# Patient Record
Sex: FEMALE | Race: WHITE | Employment: FULL TIME | URBAN - METROPOLITAN AREA
[De-identification: names, ages, dates, MRNs, and addresses within clinical notes are randomized per-mention and may not be internally consistent; named-entity substitution may affect disease eponyms.]

---

## 2021-11-16 LAB
CHLAMYDIA, EXTERNAL: NEGATIVE
HBSAG, EXTERNAL: NEGATIVE
HIV, EXTERNAL: NON REACTIVE
N. GONORRHEA, EXTERNAL: NEGATIVE
RUBELLA, EXTERNAL: 2.49
T. PALLIDUM, EXTERNAL: NON REACTIVE
TYPE, ABO & RH, EXTERNAL: NORMAL

## 2022-04-04 LAB — T. PALLIDUM, EXTERNAL: NON REACTIVE

## 2022-05-15 ENCOUNTER — ANESTHESIA (OUTPATIENT)
Dept: LABOR AND DELIVERY | Age: 32
End: 2022-05-15
Payer: COMMERCIAL

## 2022-05-15 ENCOUNTER — ANESTHESIA EVENT (OUTPATIENT)
Dept: LABOR AND DELIVERY | Age: 32
End: 2022-05-15
Payer: COMMERCIAL

## 2022-05-15 ENCOUNTER — HOSPITAL ENCOUNTER (INPATIENT)
Age: 32
LOS: 3 days | Discharge: HOME | End: 2022-05-18
Attending: OBSTETRICS & GYNECOLOGY | Admitting: OBSTETRICS & GYNECOLOGY
Payer: COMMERCIAL

## 2022-05-15 PROBLEM — O42.90 PROM (PREMATURE RUPTURE OF MEMBRANES): Status: ACTIVE | Noted: 2022-05-15

## 2022-05-15 PROBLEM — O30.043 TWIN PREGNANCY, DICHORIONIC/DIAMNIOTIC, THIRD TRIMESTER: Status: ACTIVE | Noted: 2022-05-15

## 2022-05-15 LAB
ABO + RH BLD: NORMAL
BASOPHILS # BLD: 0.1 K/UL (ref 0–0.1)
BASOPHILS NFR BLD: 1 % (ref 0–1)
BLOOD GROUP ANTIBODIES SERPL: NORMAL
DIFFERENTIAL METHOD BLD: ABNORMAL
EOSINOPHIL # BLD: 0.1 K/UL (ref 0–0.4)
EOSINOPHIL NFR BLD: 1 % (ref 0–7)
ERYTHROCYTE [DISTWIDTH] IN BLOOD BY AUTOMATED COUNT: 13.7 % (ref 11.5–14.5)
HCT VFR BLD AUTO: 38.1 % (ref 35–47)
HGB BLD-MCNC: 13.4 G/DL (ref 11.5–16)
IMM GRANULOCYTES # BLD AUTO: 0.1 K/UL (ref 0–0.04)
IMM GRANULOCYTES NFR BLD AUTO: 1 % (ref 0–0.5)
LYMPHOCYTES # BLD: 1.8 K/UL (ref 0.8–3.5)
LYMPHOCYTES NFR BLD: 19 % (ref 12–49)
MCH RBC QN AUTO: 35.8 PG (ref 26–34)
MCHC RBC AUTO-ENTMCNC: 35.2 G/DL (ref 30–36.5)
MCV RBC AUTO: 101.9 FL (ref 80–99)
MONOCYTES # BLD: 0.8 K/UL (ref 0–1)
MONOCYTES NFR BLD: 8 % (ref 5–13)
NEUTS SEG # BLD: 6.8 K/UL (ref 1.8–8)
NEUTS SEG NFR BLD: 70 % (ref 32–75)
NRBC # BLD: 0 K/UL (ref 0–0.01)
NRBC BLD-RTO: 0 PER 100 WBC
PLATELET # BLD AUTO: 204 K/UL (ref 150–400)
PMV BLD AUTO: 10.2 FL (ref 8.9–12.9)
RBC # BLD AUTO: 3.74 M/UL (ref 3.8–5.2)
SPECIMEN EXP DATE BLD: NORMAL
WBC # BLD AUTO: 9.6 K/UL (ref 3.6–11)

## 2022-05-15 PROCEDURE — 76060000078 HC EPIDURAL ANESTHESIA

## 2022-05-15 PROCEDURE — 75410000001 HC DELIVERY VAGINAL/MULTIPLE

## 2022-05-15 PROCEDURE — 0UQMXZZ REPAIR VULVA, EXTERNAL APPROACH: ICD-10-PCS | Performed by: OBSTETRICS & GYNECOLOGY

## 2022-05-15 PROCEDURE — 99285 EMERGENCY DEPT VISIT HI MDM: CPT

## 2022-05-15 PROCEDURE — 85025 COMPLETE CBC W/AUTO DIFF WBC: CPT

## 2022-05-15 PROCEDURE — 74011250636 HC RX REV CODE- 250/636: Performed by: ANESTHESIOLOGY

## 2022-05-15 PROCEDURE — 65270000029 HC RM PRIVATE

## 2022-05-15 PROCEDURE — 74011250637 HC RX REV CODE- 250/637: Performed by: OBSTETRICS & GYNECOLOGY

## 2022-05-15 PROCEDURE — 74011000250 HC RX REV CODE- 250: Performed by: ANESTHESIOLOGY

## 2022-05-15 PROCEDURE — 74011250636 HC RX REV CODE- 250/636: Performed by: OBSTETRICS & GYNECOLOGY

## 2022-05-15 PROCEDURE — 36415 COLL VENOUS BLD VENIPUNCTURE: CPT

## 2022-05-15 PROCEDURE — 75410000002 HC LABOR FEE PER 1 HR

## 2022-05-15 PROCEDURE — 75410000003 HC RECOV DEL/VAG/CSECN EA 0.5 HR

## 2022-05-15 PROCEDURE — 86900 BLOOD TYPING SEROLOGIC ABO: CPT

## 2022-05-15 PROCEDURE — 94760 N-INVAS EAR/PLS OXIMETRY 1: CPT

## 2022-05-15 PROCEDURE — 0KQM0ZZ REPAIR PERINEUM MUSCLE, OPEN APPROACH: ICD-10-PCS | Performed by: OBSTETRICS & GYNECOLOGY

## 2022-05-15 PROCEDURE — 74011000258 HC RX REV CODE- 258: Performed by: OBSTETRICS & GYNECOLOGY

## 2022-05-15 PROCEDURE — 88307 TISSUE EXAM BY PATHOLOGIST: CPT

## 2022-05-15 PROCEDURE — 4A1HXCZ MONITORING OF PRODUCTS OF CONCEPTION, CARDIAC RATE, EXTERNAL APPROACH: ICD-10-PCS | Performed by: OBSTETRICS & GYNECOLOGY

## 2022-05-15 RX ORDER — ONDANSETRON 4 MG/1
4 TABLET, ORALLY DISINTEGRATING ORAL
Status: DISCONTINUED | OUTPATIENT
Start: 2022-05-15 | End: 2022-05-15 | Stop reason: HOSPADM

## 2022-05-15 RX ORDER — NALOXONE HYDROCHLORIDE 0.4 MG/ML
0.4 INJECTION, SOLUTION INTRAMUSCULAR; INTRAVENOUS; SUBCUTANEOUS AS NEEDED
Status: DISCONTINUED | OUTPATIENT
Start: 2022-05-15 | End: 2022-05-15 | Stop reason: HOSPADM

## 2022-05-15 RX ORDER — FENTANYL CITRATE 50 UG/ML
INJECTION, SOLUTION INTRAMUSCULAR; INTRAVENOUS AS NEEDED
Status: DISCONTINUED | OUTPATIENT
Start: 2022-05-15 | End: 2022-05-15 | Stop reason: HOSPADM

## 2022-05-15 RX ORDER — FOLIC ACID 1 MG/1
0.4 TABLET ORAL DAILY
COMMUNITY

## 2022-05-15 RX ORDER — BUPIVACAINE HYDROCHLORIDE 2.5 MG/ML
INJECTION, SOLUTION EPIDURAL; INFILTRATION; INTRACAUDAL AS NEEDED
Status: DISCONTINUED | OUTPATIENT
Start: 2022-05-15 | End: 2022-05-15 | Stop reason: HOSPADM

## 2022-05-15 RX ORDER — OXYTOCIN/RINGER'S LACTATE 30/500 ML
1-25 PLASTIC BAG, INJECTION (ML) INTRAVENOUS
Status: DISCONTINUED | OUTPATIENT
Start: 2022-05-15 | End: 2022-05-18 | Stop reason: HOSPADM

## 2022-05-15 RX ORDER — OXYTOCIN/RINGER'S LACTATE 30/500 ML
10 PLASTIC BAG, INJECTION (ML) INTRAVENOUS AS NEEDED
Status: COMPLETED | OUTPATIENT
Start: 2022-05-15 | End: 2022-05-15

## 2022-05-15 RX ORDER — OXYTOCIN/RINGER'S LACTATE 30/500 ML
10 PLASTIC BAG, INJECTION (ML) INTRAVENOUS AS NEEDED
Status: CANCELLED | OUTPATIENT
Start: 2022-05-15

## 2022-05-15 RX ORDER — SERTRALINE HYDROCHLORIDE 100 MG/1
100 TABLET, FILM COATED ORAL DAILY
COMMUNITY

## 2022-05-15 RX ORDER — SODIUM CHLORIDE 0.9 % (FLUSH) 0.9 %
5-40 SYRINGE (ML) INJECTION AS NEEDED
Status: DISCONTINUED | OUTPATIENT
Start: 2022-05-15 | End: 2022-05-18 | Stop reason: HOSPADM

## 2022-05-15 RX ORDER — SODIUM CHLORIDE 0.9 % (FLUSH) 0.9 %
5-40 SYRINGE (ML) INJECTION EVERY 8 HOURS
Status: DISCONTINUED | OUTPATIENT
Start: 2022-05-15 | End: 2022-05-18 | Stop reason: HOSPADM

## 2022-05-15 RX ORDER — FENTANYL CITRATE 50 UG/ML
100 INJECTION, SOLUTION INTRAMUSCULAR; INTRAVENOUS ONCE
Status: DISCONTINUED | OUTPATIENT
Start: 2022-05-15 | End: 2022-05-15 | Stop reason: HOSPADM

## 2022-05-15 RX ORDER — NALBUPHINE HYDROCHLORIDE 20 MG/ML
5 INJECTION, SOLUTION INTRAMUSCULAR; INTRAVENOUS; SUBCUTANEOUS
Status: DISCONTINUED | OUTPATIENT
Start: 2022-05-15 | End: 2022-05-15 | Stop reason: HOSPADM

## 2022-05-15 RX ORDER — DIPHENHYDRAMINE HCL 25 MG
25 CAPSULE ORAL
Status: DISCONTINUED | OUTPATIENT
Start: 2022-05-15 | End: 2022-05-18 | Stop reason: HOSPADM

## 2022-05-15 RX ORDER — BETAMETHASONE SODIUM PHOSPHATE AND BETAMETHASONE ACETATE 3; 3 MG/ML; MG/ML
12 INJECTION, SUSPENSION INTRA-ARTICULAR; INTRALESIONAL; INTRAMUSCULAR; SOFT TISSUE EVERY 24 HOURS
Status: DISCONTINUED | OUTPATIENT
Start: 2022-05-15 | End: 2022-05-15

## 2022-05-15 RX ORDER — LIDOCAINE HYDROCHLORIDE AND EPINEPHRINE 15; 5 MG/ML; UG/ML
4.5 INJECTION, SOLUTION EPIDURAL AS NEEDED
Status: DISCONTINUED | OUTPATIENT
Start: 2022-05-15 | End: 2022-05-15 | Stop reason: HOSPADM

## 2022-05-15 RX ORDER — OXYTOCIN/RINGER'S LACTATE 30/500 ML
87.3 PLASTIC BAG, INJECTION (ML) INTRAVENOUS AS NEEDED
Status: COMPLETED | OUTPATIENT
Start: 2022-05-15 | End: 2022-05-15

## 2022-05-15 RX ORDER — OXYCODONE AND ACETAMINOPHEN 5; 325 MG/1; MG/1
1 TABLET ORAL
Status: DISCONTINUED | OUTPATIENT
Start: 2022-05-15 | End: 2022-05-18 | Stop reason: HOSPADM

## 2022-05-15 RX ORDER — SODIUM CHLORIDE 0.9 % (FLUSH) 0.9 %
5-40 SYRINGE (ML) INJECTION AS NEEDED
Status: CANCELLED | OUTPATIENT
Start: 2022-05-15

## 2022-05-15 RX ORDER — DOCUSATE SODIUM 100 MG/1
100 CAPSULE, LIQUID FILLED ORAL
Status: DISCONTINUED | OUTPATIENT
Start: 2022-05-15 | End: 2022-05-18 | Stop reason: HOSPADM

## 2022-05-15 RX ORDER — VALACYCLOVIR HYDROCHLORIDE 500 MG/1
500 TABLET, FILM COATED ORAL 2 TIMES DAILY
COMMUNITY
End: 2022-05-18

## 2022-05-15 RX ORDER — MAG HYDROX/ALUMINUM HYD/SIMETH 200-200-20
30 SUSPENSION, ORAL (FINAL DOSE FORM) ORAL
Status: DISCONTINUED | OUTPATIENT
Start: 2022-05-15 | End: 2022-05-15 | Stop reason: HOSPADM

## 2022-05-15 RX ORDER — LANOLIN ALCOHOL/MO/W.PET/CERES
3 CREAM (GRAM) TOPICAL
Status: DISCONTINUED | OUTPATIENT
Start: 2022-05-15 | End: 2022-05-18 | Stop reason: HOSPADM

## 2022-05-15 RX ORDER — OXYTOCIN/RINGER'S LACTATE 30/500 ML
87.3 PLASTIC BAG, INJECTION (ML) INTRAVENOUS AS NEEDED
Status: CANCELLED | OUTPATIENT
Start: 2022-05-15

## 2022-05-15 RX ORDER — ONDANSETRON 4 MG/1
4 TABLET, ORALLY DISINTEGRATING ORAL
Status: DISCONTINUED | OUTPATIENT
Start: 2022-05-15 | End: 2022-05-18 | Stop reason: HOSPADM

## 2022-05-15 RX ORDER — FENTANYL/BUPIVACAINE/NS/PF 2-1250MCG
1-16 PREFILLED PUMP RESERVOIR EPIDURAL CONTINUOUS
Status: DISCONTINUED | OUTPATIENT
Start: 2022-05-15 | End: 2022-05-15 | Stop reason: HOSPADM

## 2022-05-15 RX ORDER — BETAMETHASONE SODIUM PHOSPHATE AND BETAMETHASONE ACETATE 3; 3 MG/ML; MG/ML
12 INJECTION, SUSPENSION INTRA-ARTICULAR; INTRALESIONAL; INTRAMUSCULAR; SOFT TISSUE EVERY 24 HOURS
Status: DISCONTINUED | OUTPATIENT
Start: 2022-05-15 | End: 2022-05-15 | Stop reason: SDUPTHER

## 2022-05-15 RX ORDER — LIDOCAINE HYDROCHLORIDE AND EPINEPHRINE 15; 5 MG/ML; UG/ML
INJECTION, SOLUTION EPIDURAL AS NEEDED
Status: DISCONTINUED | OUTPATIENT
Start: 2022-05-15 | End: 2022-05-15 | Stop reason: HOSPADM

## 2022-05-15 RX ORDER — HYDROCORTISONE 1 %
CREAM (GRAM) TOPICAL AS NEEDED
Status: DISCONTINUED | OUTPATIENT
Start: 2022-05-15 | End: 2022-05-18 | Stop reason: HOSPADM

## 2022-05-15 RX ORDER — OXYCODONE AND ACETAMINOPHEN 5; 325 MG/1; MG/1
2 TABLET ORAL
Status: DISCONTINUED | OUTPATIENT
Start: 2022-05-15 | End: 2022-05-18 | Stop reason: HOSPADM

## 2022-05-15 RX ORDER — MISOPROSTOL 200 UG/1
800 TABLET ORAL ONCE
Status: COMPLETED | OUTPATIENT
Start: 2022-05-15 | End: 2022-05-15

## 2022-05-15 RX ORDER — AMMONIA 15 % (W/V)
1 AMPUL (EA) INHALATION AS NEEDED
Status: DISCONTINUED | OUTPATIENT
Start: 2022-05-15 | End: 2022-05-18 | Stop reason: HOSPADM

## 2022-05-15 RX ORDER — BUPIVACAINE HYDROCHLORIDE 2.5 MG/ML
INJECTION, SOLUTION EPIDURAL; INFILTRATION; INTRACAUDAL
Status: COMPLETED
Start: 2022-05-15 | End: 2022-05-15

## 2022-05-15 RX ORDER — NORETHINDRONE AND ETHINYL ESTRADIOL 0.5-0.035
10 KIT ORAL ONCE
Status: DISCONTINUED | OUTPATIENT
Start: 2022-05-15 | End: 2022-05-15 | Stop reason: HOSPADM

## 2022-05-15 RX ORDER — SODIUM CHLORIDE, SODIUM LACTATE, POTASSIUM CHLORIDE, CALCIUM CHLORIDE 600; 310; 30; 20 MG/100ML; MG/100ML; MG/100ML; MG/100ML
125 INJECTION, SOLUTION INTRAVENOUS CONTINUOUS
Status: CANCELLED | OUTPATIENT
Start: 2022-05-15

## 2022-05-15 RX ORDER — FENTANYL CITRATE 50 UG/ML
INJECTION, SOLUTION INTRAMUSCULAR; INTRAVENOUS
Status: COMPLETED
Start: 2022-05-15 | End: 2022-05-15

## 2022-05-15 RX ORDER — VITAMIN A ACETATE, BETA CAROTENE, ASCORBIC ACID, CHOLECALCIFEROL, .ALPHA.-TOCOPHEROL ACETATE, DL-, THIAMINE MONONITRATE, RIBOFLAVIN, NIACINAMIDE, PYRIDOXINE HYDROCHLORIDE, FOLIC ACID, CYANOCOBALAMIN, CALCIUM CARBONATE, FERROUS FUMARATE, ZINC OXIDE, CUPRIC OXIDE 3080; 12; 120; 400; 1; 1.84; 3; 20; 22; 920; 25; 200; 27; 10; 2 [IU]/1; UG/1; MG/1; [IU]/1; MG/1; MG/1; MG/1; MG/1; MG/1; [IU]/1; MG/1; MG/1; MG/1; MG/1; MG/1
1 TABLET, FILM COATED ORAL DAILY
COMMUNITY

## 2022-05-15 RX ORDER — HYDROCORTISONE ACETATE PRAMOXINE HCL 2.5; 1 G/100G; G/100G
CREAM TOPICAL AS NEEDED
Status: DISCONTINUED | OUTPATIENT
Start: 2022-05-15 | End: 2022-05-18 | Stop reason: HOSPADM

## 2022-05-15 RX ORDER — GUAIFENESIN 100 MG/5ML
81 LIQUID (ML) ORAL DAILY
COMMUNITY
End: 2022-05-18

## 2022-05-15 RX ORDER — SODIUM CHLORIDE 0.9 % (FLUSH) 0.9 %
5-40 SYRINGE (ML) INJECTION EVERY 8 HOURS
Status: CANCELLED | OUTPATIENT
Start: 2022-05-15

## 2022-05-15 RX ORDER — SIMETHICONE 80 MG
80 TABLET,CHEWABLE ORAL
Status: DISCONTINUED | OUTPATIENT
Start: 2022-05-15 | End: 2022-05-18 | Stop reason: HOSPADM

## 2022-05-15 RX ADMIN — BETAMETHASONE SODIUM PHOSPHATE AND BETAMETHASONE ACETATE 12 MG: 3; 3 INJECTION, SUSPENSION INTRA-ARTICULAR; INTRALESIONAL; INTRAMUSCULAR at 05:53

## 2022-05-15 RX ADMIN — AMPICILLIN SODIUM 1 G: 1 INJECTION, POWDER, FOR SOLUTION INTRAMUSCULAR; INTRAVENOUS at 14:12

## 2022-05-15 RX ADMIN — Medication 87.3 MILLI-UNITS/MIN: at 20:56

## 2022-05-15 RX ADMIN — MISOPROSTOL 800 MCG: 200 TABLET ORAL at 20:20

## 2022-05-15 RX ADMIN — Medication 10000 MILLI-UNITS: at 20:15

## 2022-05-15 RX ADMIN — Medication 5 ML: at 18:16

## 2022-05-15 RX ADMIN — FENTANYL CITRATE 100 MCG: 50 INJECTION, SOLUTION INTRAMUSCULAR; INTRAVENOUS at 18:19

## 2022-05-15 RX ADMIN — AMPICILLIN SODIUM 1 G: 1 INJECTION, POWDER, FOR SOLUTION INTRAMUSCULAR; INTRAVENOUS at 09:59

## 2022-05-15 RX ADMIN — AMPICILLIN SODIUM 2 G: 2 INJECTION, POWDER, FOR SOLUTION INTRAVENOUS at 05:51

## 2022-05-15 RX ADMIN — OXYTOCIN 2 MILLI-UNITS/MIN: 10 INJECTION INTRAVENOUS at 13:35

## 2022-05-15 RX ADMIN — AMPICILLIN SODIUM 1 G: 1 INJECTION, POWDER, FOR SOLUTION INTRAMUSCULAR; INTRAVENOUS at 18:41

## 2022-05-15 RX ADMIN — BUPIVACAINE HYDROCHLORIDE 3 ML: 2.5 INJECTION, SOLUTION EPIDURAL; INFILTRATION; INTRACAUDAL; PERINEURAL at 18:25

## 2022-05-15 RX ADMIN — BUPIVACAINE HYDROCHLORIDE 2 ML: 2.5 INJECTION, SOLUTION EPIDURAL; INFILTRATION; INTRACAUDAL; PERINEURAL at 18:22

## 2022-05-15 RX ADMIN — BUPIVACAINE HYDROCHLORIDE 2 ML: 2.5 INJECTION, SOLUTION EPIDURAL; INFILTRATION; INTRACAUDAL; PERINEURAL at 18:28

## 2022-05-15 RX ADMIN — Medication 10 ML/HR: at 18:41

## 2022-05-15 NOTE — ED PROVIDER NOTES
HPI     Chief Complaint   Patient presents with    Leakage/Loss of Fluid    Rupture of Membranes       Past Medical History:   Diagnosis Date    Asthma     Herpes simplex virus (HSV) infection     Infertility, female     Neuromyelitis optica (devic) ([facility] [address]     Psychiatric problem     Anxiety and depression       History reviewed. No pertinent surgical history. History reviewed. No pertinent family history. Social History     Socioeconomic History    Marital status: Not on file     Spouse name: Not on file    Number of children: Not on file    Years of education: Not on file    Highest education level: Not on file   Occupational History    Not on file   Tobacco Use    Smoking status: Never Smoker    Smokeless tobacco: Never Used   Substance and Sexual Activity    Alcohol use: Not Currently    Drug use: Not Currently    Sexual activity: Not on file   Other Topics Concern     Service Not Asked    Blood Transfusions Not Asked    Caffeine Concern Not Asked    Occupational Exposure Not Asked    Hobby Hazards Not Asked    Sleep Concern Not Asked    Stress Concern Not Asked    Weight Concern Not Asked    Special Diet Not Asked    Back Care Not Asked    Exercise Not Asked    Bike Helmet Not Asked   [address] Not Asked    Self-Exams Not Asked   Social History Narrative    Not on file     Social Determinants of Health     Financial Resource Strain:     Difficulty of Paying Living Expenses: Not on file   Food Insecurity:     Worried About Running Out of Food in the Last Year: Not on file    Freedom of Food in the Last Year: Not on file   Transportation Needs:     Lack of Transportation (Medical): Not on file    Lack of Transportation (Non-Medical):  Not on file   Physical Activity:     Days of Exercise per Week: Not on file    Minutes of Exercise per Session: Not on file   Stress:     Feeling of Stress : Not on file   Social Connections:     Frequency of Communication with Friends and Family: Not on file    Frequency of Social Gatherings with Friends and Family: Not on file    Attends Temple Services: Not on file    Active Member of Clubs or Organizations: Not on file    Attends Club or Organization Meetings: Not on file    Marital Status: Not on file   Intimate Partner Violence:     Fear of Current or Ex-Partner: Not on file    Emotionally Abused: Not on file    Physically Abused: Not on file    Sexually Abused: Not on file   Housing Stability:     Unable to Pay for Housing in the Last Year: Not on file    Number of [place] in the Last Year: Not on file    Unstable Housing in the Last Year: Not on file         ALLERGIES: Patient has no allergy information on record. Review of Systems   Constitutional: Negative. HENT: Negative. Eyes: Negative. Respiratory: Negative. Cardiovascular: Negative. Gastrointestinal: Negative. Endocrine: Negative. Genitourinary: Negative. Musculoskeletal: Negative. Skin: Negative. Allergic/Immunologic: Negative. Neurological: Negative. Hematological: Negative. Psychiatric/Behavioral: Negative. Vitals:    05/15/22 0418   BP: (!) 141/93   Pulse: 81   Resp: 16   Temp: 98.5 °F (36.9 °C)            Physical Exam  Nursing note reviewed. Exam conducted with a chaperone present. Constitutional:       Appearance: Normal appearance. HENT:      Head: Normocephalic and atraumatic. Nose: Nose normal.      Mouth/Throat:      Mouth: Mucous membranes are dry. Eyes:      Extraocular Movements: Extraocular movements intact. Pupils: Pupils are equal, round, and reactive to light. Cardiovascular:      Rate and Rhythm: Normal rate. Genitourinary:     General: Normal vulva. Musculoskeletal:      Cervical back: Normal range of motion and neck supple. Skin:     General: Skin is warm and dry. Neurological:      General: No focal deficit present. Mental Status: She is alert. Psychiatric:         Mood and Affect: Mood normal.      Cervix- difficult to examine Head felt pressed against cervix w/o noticeable dilation, felt effaced 60-70 %    MDM  Number of Diagnoses or Management Options     Amount and/or Complexity of Data Reviewed  Clinical lab tests: reviewed  Decide to obtain previous medical records or to obtain history from someone other than the patient: yes  Review and summarize past medical records: yes  Independent visualization of images, tracings, or specimens: yes (NST and Bedside US)                 Procedures   Bedside US reveal Di/Di twins Vertex/Vertex  NSt- /130        Variability- Mod/Mod        Decelerations-None/None        Accelerations Pos/Pos        Irregular contractions    [unfilled]  29 y/o  @ 34 weeks  Di/Di Twins Vtx/Vtx  PPROM  H/O HSV- No Lesions- Valtex 500 mg QD  H/o Depression- Sertaline 100mg every day  Asthma- Abuterol  Anxiety- Zoloft

## 2022-05-15 NOTE — H&P
History & Physical    Name: Damian Grace MRN: [id number]  SSN: [id number]    YOB: 1990  Age: 28 y.o. Sex: female      Subjective:     Reason for Admission:  Pregnancy and PPROM    History of Present Illness: Damian Grace is a 28 y.o.  female with an estimated gestational age of 31w0d with Estimated Date of Delivery: 22. Patient complains of moderate vaginal leaking of fluid and normal fetal movement for 1 days. Pregnancy has been complicated by  premature rupture of membranes and twins. Patient denies abdominal pain  , chest pain, contractions, fever, headache , nausea and vomiting, pelvic pressure, right upper quadrant pain  , shortness of breath, swelling, vaginal bleeding  and visual disturbances. OB History        1    Para        Term                AB        Living           SAB        IAB        Ectopic        Molar        Multiple        Live Births                  Past Medical History:   Diagnosis Date    Asthma     Herpes simplex virus (HSV) infection     Infertility, female     Neuromyelitis optica (devic) ([facility] [address]     Psychiatric problem     Anxiety and depression     History reviewed. No pertinent surgical history. Social History     Occupational History    Not on file   Tobacco Use    Smoking status: Never Smoker    Smokeless tobacco: Never Used   Substance and Sexual Activity    Alcohol use: Not Currently    Drug use: Not Currently    Sexual activity: Not on file     History reviewed. No pertinent family history. No Known Allergies  Prior to Admission medications    Medication Sig Start Date End Date Taking? Authorizing Provider   sertraline (Zoloft) 100 mg tablet Take 100 mg by mouth daily. Indications: anxiousness associated with depression   Yes Provider, Historical   valACYclovir (Valtrex) 500 mg tablet Take 500 mg by mouth two (2) times a day.  Indications: genital herpes   Yes Provider, Historical   prenatal vit-calcium-iron-fa (Prenatal Plus, calcium carb,) 27 mg iron- 1 mg tab Take 1 Tablet by mouth daily. Yes Provider, Historical   folic acid (FOLVITE) 1 mg tablet Take 0.4 mg by mouth daily. Yes Provider, Historical   aspirin 81 mg chewable tablet Take 81 mg by mouth daily. Yes Provider, Historical   Iron BisGl &PS Pre-U-F80-[id number] 299-61-22-6 mg-mg-mcg-mg cap Take  by mouth. Yes Provider, Historical        Review of Systems    Objective:     Vitals:    Vitals:    05/15/22 0418 05/15/22 0517 05/15/22 0530   BP: (!) 141/93  138/79   Pulse: 81  68   Resp: 16  16   Temp: 98.5 °F (36.9 °C)  98.5 °F (36.9 °C)   Height:  5' 6\" (1.676 m)       Temp (24hrs), Av.5 °F (36.9 °C), Min:98.5 °F (36.9 °C), Max:98.5 °F (36.9 °C)    BP  Min: 138/79  Max: 141/93     Physical Exam    Cervical Exam: 0 cm dilated    70% effaced    -3 station    Presenting Part: VTX/VTX   Cervical Position: posterior  Consistency: Soft  Difficult exam limited by Vaginismus  Uterine Activity: Irregular  Membranes: Premature Rupture of Membranes;  Amniotic Fluid: clear fluid  Fetal Heart Rate: Baseline: 130/130 per minute  Variability: moderate  Accelerations: yes  Decelerations: none  Uterine contractions: irregular, every 5 minutes       Labs:   Recent Results (from the past 24 hour(s))   TYPE & SCREEN    Collection Time: 05/15/22  5:12 AM   Result Value Ref Range    Crossmatch Expiration 2022,2359     ABO/Rh(D) A POSITIVE     Antibody screen NEG    CBC WITH AUTOMATED DIFF    Collection Time: 05/15/22  5:12 AM   Result Value Ref Range    WBC 9.6 3.6 - 11.0 K/uL    RBC 3.74 (L) 3.80 - 5.20 M/uL    HGB 13.4 11.5 - 16.0 g/dL    HCT 38.1 35.0 - 47.0 %    .9 (H) 80.0 - 99.0 FL    MCH 35.8 (H) 26.0 - 34.0 PG    MCHC 35.2 30.0 - 36.5 g/dL    RDW 13.7 11.5 - 14.5 %    PLATELET 144 952 - 630 K/uL    MPV 10.2 8.9 - 12.9 FL    NRBC 0.0 0  WBC    ABSOLUTE NRBC 0.00 0.00 - 0.01 K/uL    NEUTROPHILS 70 32 - 75 %    LYMPHOCYTES 19 12 - 49 % MONOCYTES 8 5 - 13 %    EOSINOPHILS 1 0 - 7 %    BASOPHILS 1 0 - 1 %    IMMATURE GRANULOCYTES 1 (H) 0.0 - 0.5 %    ABS. NEUTROPHILS 6.8 1.8 - 8.0 K/UL    ABS. LYMPHOCYTES 1.8 0.8 - 3.5 K/UL    ABS. MONOCYTES 0.8 0.0 - 1.0 K/UL    ABS. EOSINOPHILS 0.1 0.0 - 0.4 K/UL    ABS. BASOPHILS 0.1 0.0 - 0.1 K/UL    ABS. IMM. GRANS. 0.1 (H) 0.00 - 0.04 K/UL    DF AUTOMATED         Patient Active Problem List   Diagnosis Code    PROM (premature rupture of membranes) O42.90    Twin pregnancy, dichorionic/diamniotic, third trimester O30.043     Assessment and Plan:     -  Premature Rupture of the Membranes:  48 hour course of steroids to promote fetal lung maturity. Plan delivery at 34 weeks  - Twin Pregnancy:  Normal fetal growth in both twins on recent ultrasound.   VTX/VTX desired vaginal delivery   HSV- No lesions- Valtrex daily  Asthma-  Depression & Anxiety      Signed By:  Fransisca Cali MD     May 15, 2022                 [place]

## 2022-05-15 NOTE — ANESTHESIA PREPROCEDURE EVALUATION
Relevant Problems   No relevant active problems       Anesthetic History   No history of anesthetic complications            Review of Systems / Medical History  Patient summary reviewed, nursing notes reviewed and pertinent labs reviewed    Pulmonary  Within defined limits          Asthma        Neuro/Psych   Within defined limits           Cardiovascular  Within defined limits                Exercise tolerance: >4 METS     GI/Hepatic/Renal  Within defined limits              Endo/Other  Within defined limits           Other Findings   Comments:  Neuromyelitis optica          Physical Exam    Airway  Mallampati: II  TM Distance: 4 - 6 cm  Neck ROM: normal range of motion   Mouth opening: Normal     Cardiovascular  Regular rate and rhythm,  S1 and S2 normal,  no murmur, click, rub, or gallop             Dental  No notable dental hx       Pulmonary  Breath sounds clear to auscultation               Abdominal  GI exam deferred       Other Findings            Anesthetic Plan    ASA: 2  Anesthesia type: general          Induction: Intravenous  Anesthetic plan and risks discussed with: Patient      Pt has Neuromyelitis optica , hence extensive discussion with pt and  about potential of worsening of symptoms due to epidural . I discussed that the literature review did not indicate any complications but there was a potential. They are aware of the risk and agree to proceed.

## 2022-05-15 NOTE — PROGRESS NOTES
2102 Pt presenting to [facility] room 2 with complaint of PROM at 34 weeks. Pt pregnant with di/di twins. 0430  to bedside to assess. Both baby A & B are vertex on ultrasound. SVE 0/0/-3. Plan reviewed with pt to admit to L&D, give antibiotics and steroids and to monitor labor. 8378 Pt moved to LDR 11. Report given to DENISE Rodriguez

## 2022-05-15 NOTE — CONSULTS
[facility]  Prenatal Consult  Grant Evans MRN: [id number] [facility]: [id number]  Note Date: 05/15/2022? Note Time: 12:30:00  Place of Service: Labor and Delivery? Requested By: Antwon Thurston  Reason for Consultation: PPROM of twin A  Maternal History  : 1990? Mother's Age: 27? Blood Type: A Pos? Mother's Race: Unknown? ? RPR Serology: Non-Reactive? HIV: Negative? Rubella:  Immune? GBS: Unknown? HBsAg: Negative? Prenatal Care: Yes? EDC OB:   Pregnancy Complications  Twin gestation, 3rd trimester (O30.003)  Premature rupture of membranes. w/in 24 hr onset of labor, 3rd tri (O42.013)? Comment: Twin A    InVitro Fertilization, 3rd trimester (O09.813)  Maternal Steroids Yes  Last Dose Date: 05/15/2022 at 05:30:00? Maternal Medications: Yes  Ampicillin  Pregnancy Comment  uncomplicated thus far, ROM twin A @ 5/15 @ 0330  Present Plan  Expectant management with augmentation, Ampicillin for GBS unknown, betamethasone  Recommendations  I have reviewed the mother`s medical chart and spoken with the obstetrician requesting this consult. I met with the mother and father. Baby A's name is Nette Ureña and Kartik Night B's name is Padmini Fajardo. I reviewed the most common problems encountered for babies born at 29 weeks gestation, stressing that all babies are different, and the chances of complications tend to lessen at advancing gestational ages. While most any organ system can have a complication, the absolute risk of severe complications is very low and the opportunity for a good outcome is high. Among infants admitted to the NICU, survival is approximately 99% and survival without severe complications is very high. I reviewed the plan for delayed cord clamping (if appropriate) and thermoregulation support, mother would like to skin to skin after delivery if infants are stable.   Some infants at this age need respiratory support due to lung immaturity, commonly CPAP or a nasal cannula, though some need mechanical ventilation. While uncommon, some infants need other measures in their resuscitation (e.g. CPR, fluid, blood). I reviewed the typical cares given during admission to the NICU. Some infants at this gestation need IV access and that may be an umbilical catheter(s), PICC line or peripheral IV to allow nutrition. When sufficiently stable, gavage feedings will be started. We discussed the critical importance of lactation to optimize outcome (improved neurodevelopment, reduced risk of NEC and infections among other things). We discussed how we will support mothers lactation. I reviewed hyperbilirubinemia and the potential need for phototherapy. We discussed common discharge goals, including mature thermoregulation, mature respiratory status and ability to thrive on oral feedings. Many infants achieve this around 36-39 weeks corrected gestation, although some infants are ready sooner and some take longer. Discussed visitation in the NICU at this time is the band holders. Time was allotted for the family to ask questions, and all questions were answered to the best of my ability, given the information provided. The family understands and agrees with the present plan. Thank you for inviting me to speak with the family. We remain available if the family desires further discussion or clarification. The total length of floor unit time was 45 minute(s). Counseling and/or coordination of care dominated more than fifty percent of the time.   Authenticated by: ARAMIS Diaz   Date/Time: 05/15/2022 13:15

## 2022-05-15 NOTE — PROGRESS NOTES
Labor Progress Note  Patient seen, fetal heart rate and contraction pattern evaluated. Pt. Uncomfortable with painful contractions  On 6 milliunits of Pitocin    VSS AF  Physical Exam:  Cervical Exam:3-4 difficult exam  Membranes:  Intact  Uterine Activity: Frequency: regular  Fetal Heart Rate: Reactive  X 2  Accelerations: yes  Decelerations: variable  Uterine contractions: irregular    Assessment/Plan:  Reassuring fetal status.    Continue to titrate Pitocin  Ok to get Epidural    Phuc Perez MD

## 2022-05-15 NOTE — PROGRESS NOTES
Labor Progress Note  Introduced myself to Ms. Marco A Chaes and her  Ijeoma Dela Cruz. 27 y/o Di/Di Twin IUP @ 34 weeks 0 days with PPROM @ 3.30 am today . Pregnancy conceived through IVF. Pt. Denies any contractions, mild leaking of fluid, no vaginal bleeding  AFM x 2    PNC with Dr. Madeleine Mcmahon  Has received 1 dose of BMZ  Vtx /Vtx per bedside U/S by Dr. Kelly Conway    Patient seen, fetal heart rate and contraction pattern evaluated.      VSS AF  Physical Exam:  Cervical Exam: not examined  Membranes:  PPROM  Uterine Activity: Frequency: Irregular  Fetal Heart Rate: Reactive  Accelerations: yes  Decelerations: none      Assessment/Plan:  Reassuring fetal status x 2  S/P BMZ x 1: 2nd dose due in 24 hrs  On IV ampicillin for GBS prophylaxis  Discussed Pitocin augmentation after a reasonable period of expectant management 6-8 hrs  Desires vaginal delivery  Discussed the indications for  section - NRFHR, Placental abruption, Malpresentation  NICU consult      Rimma López MD

## 2022-05-15 NOTE — PROGRESS NOTES
0730 Bedside shift change report given to TYRA Thurston RN (oncoming nurse) by [employer]). Report included the following information SBAR, Kardex, Intake/Output and MAR.     0915 pt sitting up eating breakfast.    1100 fetal strip is reassuring and reactive. Pt is still not feeling the contractions she is having. Off monitor to have a break and shower. 1315 NICU NP at bedside consulting with pt and . 1335 pitocin started to induce labor. 1430 pt has been extremely uncomfortable in bed, pt up to birthing ball. 1900 pt in OR 2 for pushing    1910 NICU in OR     1935 Bedside shift change report given to JODY Li RN (oncoming nurse) by TYRA Gaona RN (offgoing nurse). Report included the following information SBAR, Kardex and MAR.

## 2022-05-16 LAB
ALBUMIN SERPL-MCNC: 2 G/DL (ref 3.5–5)
ALBUMIN/GLOB SERPL: 0.7 {RATIO} (ref 1.1–2.2)
ALP SERPL-CCNC: 156 U/L (ref 45–117)
ALT SERPL-CCNC: 26 U/L (ref 12–78)
ANION GAP SERPL CALC-SCNC: 4 MMOL/L (ref 5–15)
AST SERPL-CCNC: 55 U/L (ref 15–37)
BILIRUB SERPL-MCNC: 0.2 MG/DL (ref 0.2–1)
BUN SERPL-MCNC: 12 MG/DL (ref 6–20)
BUN/CREAT SERPL: 16 (ref 12–20)
CALCIUM SERPL-MCNC: 8.5 MG/DL (ref 8.5–10.1)
CHLORIDE SERPL-SCNC: 108 MMOL/L (ref 97–108)
CO2 SERPL-SCNC: 24 MMOL/L (ref 21–32)
CREAT SERPL-MCNC: 0.75 MG/DL (ref 0.55–1.02)
CREAT UR-MCNC: 27.5 MG/DL
ERYTHROCYTE [DISTWIDTH] IN BLOOD BY AUTOMATED COUNT: 13.7 % (ref 11.5–14.5)
GLOBULIN SER CALC-MCNC: 2.9 G/DL (ref 2–4)
GLUCOSE SERPL-MCNC: 79 MG/DL (ref 65–100)
HCT VFR BLD AUTO: 35.7 % (ref 35–47)
HGB BLD-MCNC: 12.2 G/DL (ref 11.5–16)
MCH RBC QN AUTO: 35 PG (ref 26–34)
MCHC RBC AUTO-ENTMCNC: 34.2 G/DL (ref 30–36.5)
MCV RBC AUTO: 102.3 FL (ref 80–99)
NRBC # BLD: 0 K/UL (ref 0–0.01)
NRBC BLD-RTO: 0 PER 100 WBC
PLATELET # BLD AUTO: 185 K/UL (ref 150–400)
PMV BLD AUTO: 10 FL (ref 8.9–12.9)
POTASSIUM SERPL-SCNC: 4 MMOL/L (ref 3.5–5.1)
PROT SERPL-MCNC: 4.9 G/DL (ref 6.4–8.2)
PROT UR-MCNC: 18 MG/DL (ref 0–11.9)
PROT/CREAT UR-RTO: 0.7
RBC # BLD AUTO: 3.49 M/UL (ref 3.8–5.2)
SODIUM SERPL-SCNC: 136 MMOL/L (ref 136–145)
WBC # BLD AUTO: 11.5 K/UL (ref 3.6–11)

## 2022-05-16 PROCEDURE — 80053 COMPREHEN METABOLIC PANEL: CPT

## 2022-05-16 PROCEDURE — 74011250637 HC RX REV CODE- 250/637: Performed by: OBSTETRICS & GYNECOLOGY

## 2022-05-16 PROCEDURE — 36415 COLL VENOUS BLD VENIPUNCTURE: CPT

## 2022-05-16 PROCEDURE — 84156 ASSAY OF PROTEIN URINE: CPT

## 2022-05-16 PROCEDURE — 65410000002 HC RM PRIVATE OB

## 2022-05-16 PROCEDURE — 85027 COMPLETE CBC AUTOMATED: CPT

## 2022-05-16 PROCEDURE — 74011250637 HC RX REV CODE- 250/637: Performed by: ADVANCED PRACTICE MIDWIFE

## 2022-05-16 RX ORDER — SERTRALINE HYDROCHLORIDE 50 MG/1
100 TABLET, FILM COATED ORAL DAILY
Status: DISCONTINUED | OUTPATIENT
Start: 2022-05-16 | End: 2022-05-16

## 2022-05-16 RX ORDER — IBUPROFEN 600 MG/1
600 TABLET ORAL
Status: DISCONTINUED | OUTPATIENT
Start: 2022-05-16 | End: 2022-05-18 | Stop reason: HOSPADM

## 2022-05-16 RX ORDER — IBUPROFEN 600 MG/1
600 TABLET ORAL
Qty: 30 TABLET | Refills: 1 | Status: SHIPPED
Start: 2022-05-16

## 2022-05-16 RX ORDER — IBUPROFEN 600 MG/1
600 TABLET ORAL
Qty: 30 TABLET | Refills: 1 | Status: SHIPPED | OUTPATIENT
Start: 2022-05-16 | End: 2022-05-16

## 2022-05-16 RX ORDER — FOLIC ACID/MULTIVIT,IRON,MINER 0.4MG-18MG
1 TABLET ORAL DAILY
Status: DISCONTINUED | OUTPATIENT
Start: 2022-05-16 | End: 2022-05-17 | Stop reason: CLARIF

## 2022-05-16 RX ORDER — SERTRALINE HYDROCHLORIDE 50 MG/1
100 TABLET, FILM COATED ORAL
Status: DISCONTINUED | OUTPATIENT
Start: 2022-05-16 | End: 2022-05-18 | Stop reason: HOSPADM

## 2022-05-16 RX ORDER — ACETAMINOPHEN 325 MG/1
650 TABLET ORAL
Status: DISCONTINUED | OUTPATIENT
Start: 2022-05-16 | End: 2022-05-18 | Stop reason: HOSPADM

## 2022-05-16 RX ORDER — LABETALOL 200 MG/1
200 TABLET, FILM COATED ORAL EVERY 12 HOURS
Status: DISCONTINUED | OUTPATIENT
Start: 2022-05-16 | End: 2022-05-17

## 2022-05-16 RX ADMIN — Medication 1 TABLET: at 22:46

## 2022-05-16 RX ADMIN — IBUPROFEN 600 MG: 600 TABLET, FILM COATED ORAL at 08:25

## 2022-05-16 RX ADMIN — IBUPROFEN 600 MG: 600 TABLET, FILM COATED ORAL at 14:37

## 2022-05-16 RX ADMIN — IBUPROFEN 600 MG: 600 TABLET, FILM COATED ORAL at 22:10

## 2022-05-16 RX ADMIN — SERTRALINE 100 MG: 50 TABLET, FILM COATED ORAL at 22:46

## 2022-05-16 RX ADMIN — IBUPROFEN 600 MG: 600 TABLET, FILM COATED ORAL at 01:13

## 2022-05-16 RX ADMIN — DOCUSATE SODIUM 100 MG: 100 CAPSULE, LIQUID FILLED ORAL at 01:13

## 2022-05-16 RX ADMIN — OXYCODONE AND ACETAMINOPHEN 1 TABLET: 5; 325 TABLET ORAL at 04:50

## 2022-05-16 RX ADMIN — LABETALOL HYDROCHLORIDE 200 MG: 200 TABLET, FILM COATED ORAL at 22:46

## 2022-05-16 NOTE — DISCHARGE SUMMARY
Obstetrical Discharge Summary     Name: Tony Noel MRN: [id number]  SSN: [id number]    YOB: 1990  Age: 28 y.o. Sex: female      Admit Date: 5/15/2022    Discharge Date: 2022    Admitting Physician: Celeste Stuart MD     Attending Physician:  Marco A Thomson MD     Admission Diagnoses: PROM (premature rupture of membranes) [O42.90]  Twin pregnancy, dichorionic/diamniotic, third trimester [O30.043]    Discharge Diagnoses:   Information for the patient's :  Antonio Duty [[id number]]   Delivery of a   female infant via Vaginal, Spontaneous on 5/15/2022 at 8:02 PM  by Marzette Plume. Apgars were 4  and 5 . Information for the patient's :  Antonio Duty [[id number]]   Delivery of a   female infant via Vaginal, Spontaneous on 5/15/2022 at 8:14 PM  by Marzette Plume. Apgars were 9  and 9 . Additional Diagnoses:   Hospital Problems  Never Reviewed          Codes Class Noted POA    PROM (premature rupture of membranes) ICD-10-CM: O42.90  ICD-9-CM: 658.10  5/15/2022 Unknown        Twin pregnancy, dichorionic/diamniotic, third trimester ICD-10-CM: O30.043  ICD-9-CM: 651.03, V91.03  5/15/2022 Unknown             Lab Results   Component Value Date/Time    Rubella, External 2.49 2021 12:00 AM       Hospital Course: Normal hospital course following the delivery. Patient Instructions:   Current Discharge Medication List      START taking these medications    Details   ibuprofen (MOTRIN) 600 mg tablet Take 1 Tablet by mouth every six (6) hours as needed for Pain. Qty: 30 Tablet, Refills: 1         CONTINUE these medications which have NOT CHANGED    Details   sertraline (Zoloft) 100 mg tablet Take 100 mg by mouth daily. Indications: anxiousness associated with depression      prenatal vit-calcium-iron-fa (Prenatal Plus, calcium carb,) 27 mg iron- 1 mg tab Take 1 Tablet by mouth daily. folic acid (FOLVITE) 1 mg tablet Take 0.4 mg by mouth daily.          STOP taking these medications       valACYclovir (Valtrex) 500 mg tablet Comments:   Reason for Stopping:         aspirin 81 mg chewable tablet Comments:   Reason for Stopping:         Iron BisGl &PS Hwf-[id number]-Y33-[id number]-Ca 970-04-41-5 mg-mg-mcg-mg cap Comments:   Reason for Stopping:               Disposition at Discharge: Home or self care    Condition at Discharge: Stable    Reference my discharge instructions.     Follow-up Appointments   Procedures    FOLLOW UP VISIT Appointment in: 6 Weeks     Standing Status:   Standing     Number of Occurrences:   1     Order Specific Question:   Appointment in     Answer:   6 Weeks        Signed By:  Weston Quinn MD     May 16, 2022

## 2022-05-16 NOTE — ROUTINE PROCESS
0740:Bedside and Verbal shift change report given to ROSALBA Shirley (oncoming nurse) by Beht (offgoing nurse). Report included the following information SBAR.     5153: Pt BP elevated at 145/95, also swelling in lower extremities increased past feet into both calves and R thigh. 1453:  Dr. Freya Capps notified. Pre-E labs ordered. 1900: Protein/creatine ratio 0.7, Dr Freya Capps notified. No new orders received, will continue to monitor pt at this time. Will consider starting BP medication  if BP is 150's/100's.

## 2022-05-16 NOTE — PROGRESS NOTES
Post-Partum Day Number 1 Progress Note    Denisse Blankenship     Assessment: Doing well, post partum day 1 s/p PPROM,  of twins. Plan:  - Continue routine postpartum and perineal care as well as maternal education.  - Babies stable in NICU. - Continue Zoloft  - Martinez in due to vulvar swelling, remove today. - Plan discharge home [id number] Discharge Date: Tomorrow. Information for the patient's :  Summer Nearing [[id number]]   Vaginal, Spontaneous   Information for the patient's :  George Nearing [[id number]]   Vaginal, Spontaneous    Patient doing well without significant complaint. Martinez still in. Mood doing well. Babies stable in NICU. Vitals:  Visit Vitals  /74 (BP 1 Location: Right upper arm, BP Patient Position: Semi fowlers)   Pulse 88   Temp 98.1 °F (36.7 °C)   Resp 14   Ht 5' 6\" (1.676 m)   SpO2 100%   Breastfeeding Unknown     Temp (24hrs), Av.2 °F (36.8 °C), Min:97.9 °F (36.6 °C), Max:98.4 °F (36.9 °C)        Exam:   Patient without distress. Fundus firm, nontender per nursing fundal checks. Perineum with normal lochia noted per nursing assessment. Lower extremities are negative for pathological edema. Labs:     Lab Results   Component Value Date/Time    WBC 9.6 05/15/2022 05:12 AM    HGB 13.4 05/15/2022 05:12 AM    HCT 38.1 05/15/2022 05:12 AM    PLATELET 556  05:12 AM       No results found for this or any previous visit (from the past 24 hour(s)).

## 2022-05-16 NOTE — OP NOTES
This patient was 30 or more weeks gestation at the time of [facility] go-live. For complete information pertaining to this patient's pregnancy, please refer to the paper chart and ACOG form. Delivery Note    Obstetrician:  Chandler Sinha MD    Assistant: None    Pre-Delivery Diagnosis: Di/Di Twin IUP @ 34 weeks with PPROM    Post-Delivery Diagnosis: Living  infant(s) and Female x 2  Twin A \" Sirisha \"  Twin B \" Mabeline Gross \"    Intrapartum Event: None    Procedure: Spontaneous vaginal delivery    Epidural: YES    Monitor:  Fetal Heart Tones - Internal and Uterine Contractions - External    Indications for instrumental delivery: none    Estimated Blood Loss: No data found    Episiotomy: none    Laceration(s):  2nd degree repaired with 2-O vicryl and periurethral repaired with 3-0 with interrupted sutures. Edematous periurethral. Martinez placed.      Laceration(s) repair: YES    Presentation: Cephalic    Fetal Description: morales    Fetal Position: OA -Twin A  OP - Twin B    Birth Weight:     Birth Length:     Apgar - One Minute: 7- Twin A: Twin B 7    Apgar - Five Minutes: 9Twin A: Twin B 9    Umbilical Cord: 3 vessels present    Specimens: PLacenta           Complications: Mild uterine inertia: 800 mcg cytotec placed in the rectum       EBL : 1000 ml      Cord Blood Results:   Information for the patient's :  Arline Briscoe [[id number]]   No results found for: PCTABR, PCTDIG, BILI, [address]   Information for the patient's :  Arline Briscoe [[id number]]   No results found for: PCTABR, PCTDIG, BILI, ABORH     Prenatal Labs:     Lab Results   Component Value Date/Time    ABO/Rh(D) A POSITIVE 05/15/2022 05:12 AM    ABO,Rh A positive 2021 12:00 AM    HBsAg, External negative 2021 12:00 AM    HIV, External non reactive 2021 12:00 AM    Rubella, External 2.49 2021 12:00 AM    Gonorrhea, External negative 2021 12:00 AM    Chlamydia, External negative 2021 12:00 AM        Attending Attestation: I performed the procedure    Signed By:  Manpreet Lofton MD     May 15, 2022

## 2022-05-16 NOTE — LACTATION NOTE
Patient and  educated to breast pump and cleaning of pump. Also educated on storage of milk obtained. Both able to teach back to nurse. Pt pumped for 20 min, obtained 2.5ml of colostrum, labeled and sent to NICU.

## 2022-05-16 NOTE — ROUTINE PROCESS
TRANSFER - IN REPORT:    Verbal report received from AJ Li RN(name) on Juan Manuel Blankenship  being received from L afshin D(unit) for routine progression of care      Report consisted of patients Situation, Background, Assessment and   Recommendations(SBAR). Information from the following report(s) SBAR, Procedure Summary, Intake/Output and Recent Results was reviewed with the receiving nurse. Opportunity for questions and clarification was provided. Assessment completed upon patients arrival to unit and care assumed.

## 2022-05-16 NOTE — PROGRESS NOTES
OB Hospitalist    LATE ENTRY    Called to the bedside by RN as pt. Boardman rectal pressure soon after Epidural  Cervix C/C +1  Pt.  Moved to OR and will prepare for Twin delivery  NICU notified  LDR team ready in the Gafarhat Tomas MD

## 2022-05-16 NOTE — PROGRESS NOTES
- Bedside and Verbal shift change report given to JODY Li RN (oncoming nurse) by TYRA Gaona RN (offgoing nurse). Report included the following information SBAR, MAR and Recent Results.  -  living female infant   -  living female infant   - Martinez catheter placed by Dr. José Antonio Ndiaye, orders received to leave in for the night due to vaginal swelling. Remove in morning, at 0600.    0020 - TRANSFER - OUT REPORT:    Verbal report given to ROSIE Hallman(name) on Filippo Blankenship  being transferred to JODY Li RN(unit) for routine progression of care       Report consisted of patients Situation, Background, Assessment and   Recommendations(SBAR). Information from the following report(s) SBAR, MAR and Recent Results was reviewed with the receiving nurse. Lines:   Peripheral IV 05/15/22 Anterior; Left Forearm (Active)   Site Assessment Clean, dry, & intact 05/15/22 0530   Phlebitis Assessment 0 05/15/22 0530   Infiltration Assessment 0 05/15/22 0530   Dressing Status Clean, dry, & intact 05/15/22 0530   Dressing Type Tape;Transparent 05/15/22 0530        Opportunity for questions and clarification was provided.       Patient transported with:   Registered Nurse

## 2022-05-17 PROCEDURE — 74011250637 HC RX REV CODE- 250/637: Performed by: OBSTETRICS & GYNECOLOGY

## 2022-05-17 PROCEDURE — 74011250637 HC RX REV CODE- 250/637: Performed by: ADVANCED PRACTICE MIDWIFE

## 2022-05-17 PROCEDURE — 65410000002 HC RM PRIVATE OB

## 2022-05-17 PROCEDURE — 74011250636 HC RX REV CODE- 250/636: Performed by: OBSTETRICS & GYNECOLOGY

## 2022-05-17 PROCEDURE — 74011000258 HC RX REV CODE- 258: Performed by: OBSTETRICS & GYNECOLOGY

## 2022-05-17 PROCEDURE — 74011000258 HC RX REV CODE- 258: Performed by: ADVANCED PRACTICE MIDWIFE

## 2022-05-17 PROCEDURE — 74011250636 HC RX REV CODE- 250/636: Performed by: ADVANCED PRACTICE MIDWIFE

## 2022-05-17 RX ORDER — LABETALOL HYDROCHLORIDE 5 MG/ML
20 INJECTION, SOLUTION INTRAVENOUS
Status: DISPENSED | OUTPATIENT
Start: 2022-05-17 | End: 2022-05-18

## 2022-05-17 RX ORDER — HYDRALAZINE HYDROCHLORIDE 20 MG/ML
10 INJECTION INTRAMUSCULAR; INTRAVENOUS
Status: ACTIVE | OUTPATIENT
Start: 2022-05-17 | End: 2022-05-18

## 2022-05-17 RX ORDER — LABETALOL 200 MG/1
200 TABLET, FILM COATED ORAL ONCE
Status: COMPLETED | OUTPATIENT
Start: 2022-05-17 | End: 2022-05-17

## 2022-05-17 RX ORDER — CALCIUM GLUCONATE 20 MG/ML
1 INJECTION, SOLUTION INTRAVENOUS ONCE
Status: DISPENSED | OUTPATIENT
Start: 2022-05-17 | End: 2022-05-17

## 2022-05-17 RX ORDER — LABETALOL HYDROCHLORIDE 5 MG/ML
80 INJECTION, SOLUTION INTRAVENOUS
Status: DISPENSED | OUTPATIENT
Start: 2022-05-17 | End: 2022-05-18

## 2022-05-17 RX ORDER — SODIUM CHLORIDE 0.9 % (FLUSH) 0.9 %
5-40 SYRINGE (ML) INJECTION EVERY 8 HOURS
Status: DISCONTINUED | OUTPATIENT
Start: 2022-05-17 | End: 2022-05-18 | Stop reason: HOSPADM

## 2022-05-17 RX ORDER — MAGNESIUM SULFATE HEPTAHYDRATE 40 MG/ML
4 INJECTION, SOLUTION INTRAVENOUS ONCE
Status: COMPLETED | OUTPATIENT
Start: 2022-05-17 | End: 2022-05-17

## 2022-05-17 RX ORDER — SODIUM CHLORIDE, SODIUM LACTATE, POTASSIUM CHLORIDE, CALCIUM CHLORIDE 600; 310; 30; 20 MG/100ML; MG/100ML; MG/100ML; MG/100ML
75 INJECTION, SOLUTION INTRAVENOUS CONTINUOUS
Status: DISCONTINUED | OUTPATIENT
Start: 2022-05-17 | End: 2022-05-18 | Stop reason: HOSPADM

## 2022-05-17 RX ORDER — SODIUM CHLORIDE 0.9 % (FLUSH) 0.9 %
5-40 SYRINGE (ML) INJECTION AS NEEDED
Status: DISCONTINUED | OUTPATIENT
Start: 2022-05-17 | End: 2022-05-18 | Stop reason: HOSPADM

## 2022-05-17 RX ORDER — LABETALOL 200 MG/1
200 TABLET, FILM COATED ORAL EVERY 8 HOURS
Status: DISCONTINUED | OUTPATIENT
Start: 2022-05-17 | End: 2022-05-18 | Stop reason: HOSPADM

## 2022-05-17 RX ORDER — LABETALOL HYDROCHLORIDE 5 MG/ML
40 INJECTION, SOLUTION INTRAVENOUS
Status: DISPENSED | OUTPATIENT
Start: 2022-05-17 | End: 2022-05-18

## 2022-05-17 RX ORDER — NIFEDIPINE 30 MG/1
30 TABLET, EXTENDED RELEASE ORAL DAILY
Status: DISCONTINUED | OUTPATIENT
Start: 2022-05-17 | End: 2022-05-18 | Stop reason: HOSPADM

## 2022-05-17 RX ORDER — FOLIC ACID/MULTIVIT,IRON,MINER 0.4MG-18MG
1 TABLET ORAL DAILY
Status: DISCONTINUED | OUTPATIENT
Start: 2022-05-17 | End: 2022-05-18 | Stop reason: HOSPADM

## 2022-05-17 RX ADMIN — LABETALOL HYDROCHLORIDE 200 MG: 200 TABLET, FILM COATED ORAL at 22:09

## 2022-05-17 RX ADMIN — NIFEDIPINE 30 MG: 30 TABLET, FILM COATED, EXTENDED RELEASE ORAL at 18:31

## 2022-05-17 RX ADMIN — MAGNESIUM SULFATE HEPTAHYDRATE 2 G/HR: 500 INJECTION, SOLUTION INTRAMUSCULAR; INTRAVENOUS at 04:43

## 2022-05-17 RX ADMIN — LABETALOL HYDROCHLORIDE 200 MG: 200 TABLET, FILM COATED ORAL at 09:47

## 2022-05-17 RX ADMIN — MAGNESIUM SULFATE HEPTAHYDRATE 2 G/HR: 500 INJECTION, SOLUTION INTRAMUSCULAR; INTRAVENOUS at 15:10

## 2022-05-17 RX ADMIN — SODIUM CHLORIDE, POTASSIUM CHLORIDE, SODIUM LACTATE AND CALCIUM CHLORIDE 75 ML/HR: 600; 310; 30; 20 INJECTION, SOLUTION INTRAVENOUS at 16:37

## 2022-05-17 RX ADMIN — MAGNESIUM SULFATE HEPTAHYDRATE 2 G/HR: 500 INJECTION, SOLUTION INTRAMUSCULAR; INTRAVENOUS at 20:31

## 2022-05-17 RX ADMIN — Medication 1 TABLET: at 11:11

## 2022-05-17 RX ADMIN — SERTRALINE 100 MG: 50 TABLET, FILM COATED ORAL at 22:08

## 2022-05-17 RX ADMIN — MAGNESIUM SULFATE HEPTAHYDRATE 4 G: 40 INJECTION, SOLUTION INTRAVENOUS at 04:19

## 2022-05-17 RX ADMIN — MAGNESIUM SULFATE HEPTAHYDRATE 2 G/HR: 500 INJECTION, SOLUTION INTRAMUSCULAR; INTRAVENOUS at 10:46

## 2022-05-17 RX ADMIN — LABETALOL HYDROCHLORIDE 200 MG: 200 TABLET, FILM COATED ORAL at 15:09

## 2022-05-17 RX ADMIN — DOCUSATE SODIUM 100 MG: 100 CAPSULE, LIQUID FILLED ORAL at 22:28

## 2022-05-17 RX ADMIN — SODIUM CHLORIDE, POTASSIUM CHLORIDE, SODIUM LACTATE AND CALCIUM CHLORIDE 75 ML/HR: 600; 310; 30; 20 INJECTION, SOLUTION INTRAVENOUS at 04:13

## 2022-05-17 NOTE — PROGRESS NOTES
Bedside and Verbal shift change report given to JODY Lemus RN (oncoming nurse) by Ike Raphael RN (offgoing nurse). Report included the following information SBAR.     8847: Midwives called due to automatic BP of 158/93 and manual recheck of 152/90. Will wait for call back with any new orders. 2233Eucameron Weaver CNM called back with order of 200 mg PO labetalol Q12. Will continue to monitor pressures throughout the night. 0310Camjoanna Hay CNM about BPs again, 158/80 and manual 160/90. HÉCTOR is calling Dr. Aly Forbes to discuss further treatment plant. 80Eucameron Weaver called back stating that she would go talk to patient to update her and let her know she will be transferred over to L&D to be started on Mag.     0330: JODY Sommers at bedside to discuss transfer. 0345: MI[facility] nurses at bedside to help transfer patient over to L&D.      Norm Hussein RN

## 2022-05-17 NOTE — PROGRESS NOTES
1530- Bedside and Verbal shift change report given to DALY Baird (oncoming nurse) by Dina Correa RN (offgoing nurse). Report included the following information SBAR, Kardex, Procedure Summary, Intake/Output, MAR, Accordion and Recent Results.

## 2022-05-17 NOTE — LACTATION NOTE
Patient concerned that she is not collecting as much colostrum today as she was a yesterday. We reviewed milk production and I encouraged her to continue to pump every 3 hours and to follow up with hand expression each time.

## 2022-05-17 NOTE — PROGRESS NOTES
Alerted by nursing of elevated BPs, continues on magnesium. BPs now 155-167/82-89    Will give 200mg PO labetalol now and then increase to TID labetalol  Continue to monitor and adjust antihypertensives as necessary  -----------------  Addendum 1825    BPs reviewed again, slight improvement only with additional labetalol, currently 150s-160s/80s-90s, therefore will add in 30XL procardia daily to better help control BPs.

## 2022-05-17 NOTE — ROUTINE PROCESS
0730 Bedside shift change report given to DALY Fink RN (oncoming nurse) by Shreya Arthur RN (offgoing nurse). Report included the following information SBAR, MAR, I/O.       8218 TRANSFER - OUT REPORT:    Verbal report given to MARIA ELENA Smalls RN(name) on Ivan Blankenship  being transferred to MIU (unit) for routine progression of care       Report consisted of patients Situation, Background, Assessment and   Recommendations(SBAR). Information from the following report(s) SBAR was reviewed with the receiving nurse. Lines:   Peripheral IV 05/15/22 Anterior; Left Forearm (Active)   Site Assessment Clean, dry, & intact 05/17/22 0405   Phlebitis Assessment 0 05/17/22 0405   Infiltration Assessment 0 05/17/22 0405   Dressing Status Clean, dry, & intact 05/17/22 0405   Dressing Type Tape;Transparent 05/17/22 0405   Hub Color/Line Status Pink 05/17/22 0405   Action Taken Open ports on tubing capped 05/16/22 0430   Alcohol Cap Used Yes 05/16/22 0430        Opportunity for questions and clarification was provided.       Patient transported with:   Registered Nurse

## 2022-05-17 NOTE — PROGRESS NOTES
TRANSFER - IN REPORT:    Verbal report received from DALY aWrd RN (name) on [facility]  being received from L&D (unit) for routine progression of care      Report consisted of patients Situation, Background, Assessment and   Recommendations(SBAR). Information from the following report(s) SBAR, Kardex, Intake/Output and MAR was reviewed with the receiving nurse. Opportunity for questions and clarification was provided. Assessment completed upon patients arrival to unit and care assumed. [address], MD made aware of patients desire to visit the NICU, approval given if RN is available to accompany patient while on Mag drip. 1210- Transporting patient via wheelchair with this RN and IV pole to NICU. Patient stable at this time.      Claudia Chin RN

## 2022-05-17 NOTE — PROGRESS NOTES
0400: TRANSFER - IN REPORT:    Verbal report received from JODY Lemus RN (name) on Austin Blankenship  being received from MIU (unit) for change in patient condition(PP MAG)      Report consisted of patients Situation, Background, Assessment and   Recommendations(SBAR). Information from the following report(s) SBAR, Intake/Output, MAR and Recent Results was reviewed with the receiving nurse. Opportunity for questions and clarification was provided. Assessment completed upon patients arrival to unit and care assumed. 0740: Bedside shift change report given to DALY Hernandes RN (oncoming nurse) by Marva Talavera RN (offgoing nurse). Report included the following information SBAR, Intake/Output, MAR and Recent Results.

## 2022-05-17 NOTE — PROGRESS NOTES
Called by RN for Bps still mild range, but upper mild range. Denies h/a, changes in vision, RUQ/epigastric pain. Meets criteria for preE, p/cr 0.7. Reviewed labs and Bps with Dr. Belinda Rae. Will start Labetalol 200mg PO BID.

## 2022-05-17 NOTE — PROGRESS NOTES
Post-Partum Day Number 2 Progress Note    Juan Manuel Blankenship     Assessment/Plan: PPD2 s/p PTSVD 34wk di/di twins stable in NICU, now with severe PP preE  - Severe preE PP - by severe BPs treated with labetalol 200mg PO BID, normal labs aside from ur P:C 0.7 and mild AST elevation of 55, now normal to mild BPs on magnesium until tomorrow 0400   - continue to monitor BPs and adjust antihypertensives as indicated  - will need 1 week BP check upon discharge  - anxiety cont zoloft    Information for the patient's :  Ariadna Singh [[id number]]   Vaginal, Spontaneous   Information for the patient's :  Ariadna Singh [[id number]]   Vaginal, Spontaneous    Patient doing well without significant complaint. Voiding without difficulty, normal lochia. Ready for discharge home. Vitals:  Visit Vitals  /75   Pulse 75   Temp 98 °F (36.7 °C)   Resp 16   Ht 5' 6\" (1.676 m)   SpO2 98%   Breastfeeding Unknown     Temp (24hrs), Av °F (36.7 °C), Min:97.7 °F (36.5 °C), Max:98.5 °F (36.9 °C)      Exam:        Patient without distress.                 Fundus firm, nontender per nursing fundal checks                Perineum with normal lochia noted per nursing assessment                Lower extremities are negative for pathological edema    Labs:     Lab Results   Component Value Date/Time    WBC 11.5 (H) 2022 04:25 PM    WBC 9.6 05/15/2022 05:12 AM    HGB 12.2 2022 04:25 PM    HGB 13.4 05/15/2022 05:12 AM    HCT 35.7 2022 04:25 PM    HCT 38.1 05/15/2022 05:12 AM    PLATELET 700  04:25 PM    PLATELET 466  05:12 AM       Recent Results (from the past 24 hour(s))   CBC W/O DIFF    Collection Time: 22  4:25 PM   Result Value Ref Range    WBC 11.5 (H) 3.6 - 11.0 K/uL    RBC 3.49 (L) 3.80 - 5.20 M/uL    HGB 12.2 11.5 - 16.0 g/dL    HCT 35.7 35.0 - 47.0 %    .3 (H) 80.0 - 99.0 FL    MCH 35.0 (H) 26.0 - 34.0 PG    MCHC 34.2 30.0 - 36.5 g/dL    RDW 13.7 11.5 - 14.5 %    PLATELET 429 526 - 432 K/uL    MPV 10.0 8.9 - 12.9 FL    NRBC 0.0 0  WBC    ABSOLUTE NRBC 0.00 0.00 - 2.98 K/uL   METABOLIC PANEL, COMPREHENSIVE    Collection Time: 05/16/22  4:25 PM   Result Value Ref Range    Sodium 136 136 - 145 mmol/L    Potassium 4.0 3.5 - 5.1 mmol/L    Chloride 108 97 - 108 mmol/L    CO2 24 21 - 32 mmol/L    Anion gap 4 (L) 5 - 15 mmol/L    Glucose 79 65 - 100 mg/dL    BUN 12 6 - 20 MG/DL    Creatinine 0.75 0.55 - 1.02 MG/DL    BUN/Creatinine ratio 16 12 - 20      GFR est AA >60 >60 ml/min/1.73m2    GFR est non-AA >60 >60 ml/min/1.73m2    Calcium 8.5 8.5 - 10.1 MG/DL    Bilirubin, total 0.2 0.2 - 1.0 MG/DL    ALT (SGPT) 26 12 - 78 U/L    AST (SGOT) 55 (H) 15 - 37 U/L    Alk. phosphatase 156 (H) 45 - 117 U/L    Protein, total 4.9 (L) 6.4 - 8.2 g/dL    Albumin 2.0 (L) 3.5 - 5.0 g/dL    Globulin 2.9 2.0 - 4.0 g/dL    A-G Ratio 0.7 (L) 1.1 - 2.2     PROTEIN/CREATININE RATIO, URINE    Collection Time: 05/16/22  4:43 PM   Result Value Ref Range    Protein, urine random 18 (H) 0.0 - 11.9 mg/dL    Creatinine, urine 27.50 mg/dL    Protein/Creat.  urine Ratio 0.7

## 2022-05-17 NOTE — PROGRESS NOTES
Called by RN who reports severe range /90. Patient had Labetalol 200mg PO just before 2300. Patient denies any h/a, visual changes, RUQ/epigastric pain. Reviewed patient and Bps with Dr. Mateus Franco and decision made to transfer patient back to L&D for PP Mag. In room to see patient and reviewed labs/BP and preE dx as well as transfer to L&D and Magnesium infusion. All questions asked/answered and patient/partner agree with plan.

## 2022-05-18 VITALS
SYSTOLIC BLOOD PRESSURE: 143 MMHG | OXYGEN SATURATION: 97 % | HEIGHT: 66 IN | RESPIRATION RATE: 16 BRPM | DIASTOLIC BLOOD PRESSURE: 82 MMHG | TEMPERATURE: 98.1 F | HEART RATE: 66 BPM

## 2022-05-18 PROCEDURE — 74011250637 HC RX REV CODE- 250/637: Performed by: OBSTETRICS & GYNECOLOGY

## 2022-05-18 PROCEDURE — 74011000258 HC RX REV CODE- 258: Performed by: OBSTETRICS & GYNECOLOGY

## 2022-05-18 PROCEDURE — 74011250636 HC RX REV CODE- 250/636: Performed by: OBSTETRICS & GYNECOLOGY

## 2022-05-18 PROCEDURE — 74011250637 HC RX REV CODE- 250/637: Performed by: ADVANCED PRACTICE MIDWIFE

## 2022-05-18 RX ORDER — LABETALOL 200 MG/1
200 TABLET, FILM COATED ORAL EVERY 8 HOURS
Qty: 90 TABLET | Refills: 1 | Status: SHIPPED | OUTPATIENT
Start: 2022-05-18

## 2022-05-18 RX ORDER — NIFEDIPINE 30 MG/1
30 TABLET, EXTENDED RELEASE ORAL DAILY
Qty: 30 TABLET | Refills: 1 | Status: SHIPPED | OUTPATIENT
Start: 2022-05-19

## 2022-05-18 RX ADMIN — MAGNESIUM SULFATE HEPTAHYDRATE 2 G/HR: 500 INJECTION, SOLUTION INTRAMUSCULAR; INTRAVENOUS at 01:10

## 2022-05-18 RX ADMIN — Medication 1 TABLET: at 08:23

## 2022-05-18 RX ADMIN — NIFEDIPINE 30 MG: 30 TABLET, FILM COATED, EXTENDED RELEASE ORAL at 08:23

## 2022-05-18 RX ADMIN — LABETALOL HYDROCHLORIDE 200 MG: 200 TABLET, FILM COATED ORAL at 14:15

## 2022-05-18 RX ADMIN — LABETALOL HYDROCHLORIDE 200 MG: 200 TABLET, FILM COATED ORAL at 06:07

## 2022-05-18 NOTE — PROGRESS NOTES
Bedside and Verbal shift change report given to ROSALBA Chaudhari RN (oncoming nurse) by Alexander Kenyon RN (offgoing nurse). Report included the following information SBAR, Kardex, Procedure Summary, Intake/Output, MAR and Accordion.      7435 24hr Mag completed and stopped

## 2022-05-18 NOTE — LACTATION NOTE
This note was copied from a baby's chart. Twin infants brought to breast today one at a time. Baby A immediately latched. Infant was showing strong instinct to feed, rooting, and opening wide. Simply cuddling baby facing breast with hands along side each side of breast was enough to establish latch. Mother was pleased to see baby's innate behaviors for breastfeeding. Infant A fed well for 15 minutes then unlatched content. Father then gave baby's scheduled bottle, she tolerated it well. Baby B also showed strong instinct to feed however took more assistance with establishing and maintaining latch. Mother shown how to position and assist baby using cross cradle hold. Once assisted baby was able to nurse for ten minutes off and on. Baby B showed slowing of stamina so we took her off and had mother give her the scheduled bottle, the tolerated it well. Mother will continue to pump at home and bring her milk to the hospital when she visits. Mother will request lactation service as needed when she visits.

## 2022-05-18 NOTE — PROGRESS NOTES
Post-Partum Day Number 3 Progress Note    Denisse Blankenship     Assessment: Post partum day 3  s/p PTSVD x 2 of 34wk di/di twins stable in NICU  - Severe preE Pre eclampsia - dx by severe BPs  - normal labs aside from ur P:C 0.7 and mild AST elevation of 55, s/p mag x 24 hours, BPs normal on labetalol 200mg po bid and procardia XL 30 mg daily  - will need 1 week BP check upon discharge  - anxiety cont zoloft      Plan:   - Discharge home today per pt request given BPs normal now. - Follow up in office in 1 week(s) with [facility].  - Pain medication prescription(s) sent. - Questions answered. Discharge Time: Greater than 30 minutes were spent discharging the patient. Information for the patient's :  Rubens You [[id number]]   Vaginal, Spontaneous   Information for the patient's :  Rubens You [[id number]]   Vaginal, Spontaneous    Patient doing well without significant complaint. Voiding without difficulty, normal lochia. Ready for discharge home. Vitals:  Visit Vitals  /79 (BP 1 Location: Right upper arm, BP Patient Position: At rest)   Pulse 68   Temp 97.8 °F (36.6 °C)   Resp 17   Ht 5' 6\" (1.676 m)   SpO2 96%   Breastfeeding Unknown     Temp (24hrs), Av °F (36.7 °C), Min:97.5 °F (36.4 °C), Max:98.6 °F (37 °C)      Exam:      Patient without distress. Fundus firm, nontender per nursing fundal checks. Perineum with normal lochia noted per nursing assessment. Lower extremities are negative for pathological edema.     Labs:   Lab Results   Component Value Date/Time    WBC 11.5 (H) 2022 04:25 PM    WBC 9.6 05/15/2022 05:12 AM    HGB 12.2 2022 04:25 PM    HGB 13.4 05/15/2022 05:12 AM    HCT 35.7 2022 04:25 PM    HCT 38.1 05/15/2022 05:12 AM    PLATELET 613  04:25 PM    PLATELET 943  05:12 AM

## 2022-05-18 NOTE — DISCHARGE SUMMARY
Obstetrical Discharge Summary     Name: Erica Rojo MRN: [id number]  SSN: [id number]    YOB: 1990  Age: 28 y.o. Sex: female      Admit Date: 5/15/2022    Discharge Date: 2022     Admitting Physician: Jessica Schofield MD     Attending Physician:  Jason Zapien MD     Admission Diagnoses: PROM (premature rupture of membranes) [O42.90]  Twin pregnancy, dichorionic/diamniotic, third trimester [O30.043]    Discharge Diagnoses:   Information for the patient's :  Genny Gonzales [[id number]]   Delivery of a 2.13 kg female infant via Vaginal, Spontaneous on 5/15/2022 at 8:02 PM  by Tom Blotter. Apgars were 4  and 5 . Information for the patient's :  Genny Gonzales [[id number]]   Delivery of a 1.705 kg female infant via Vaginal, Spontaneous on 5/15/2022 at 8:14 PM  by Tom Blotter. Apgars were 9  and 9 . Additional Diagnoses:   Hospital Problems  Never Reviewed          Codes Class Noted POA     delivery ICD-10-CM: O60.10X0  ICD-9-CM: 644.21  2022 Unknown        Delivery of twins, both live ICD-10-CM: Z37.2  ICD-9-CM: V27.2  2022 Unknown        PROM (premature rupture of membranes) ICD-10-CM: O42.90  ICD-9-CM: 658.10  5/15/2022 Unknown        Twin pregnancy, dichorionic/diamniotic, third trimester ICD-10-CM: O30.043  ICD-9-CM: 651.03, V91.03  5/15/2022 Unknown             Lab Results   Component Value Date/Time    Rubella, External 2.49 2021 12:00 AM       Hospital Course: Hospital course was c/b postpartum severe pre eclampsia. She received 24 hours of magnesium and was started on labetalol and procardia for BP management. Her BPs were normal on PPD3 thus she was discharged home with close f/u in the office. Patient Instructions:   Current Discharge Medication List      START taking these medications    Details   labetaloL (NORMODYNE) 200 mg tablet Take 1 Tablet by mouth every eight (8) hours.   Qty: 90 Tablet, Refills: 1      NIFEdipine ER (PROCARDIA XL) 30 mg ER tablet Take 1 Tablet by mouth daily. Qty: 30 Tablet, Refills: 1      ibuprofen (MOTRIN) 600 mg tablet Take 1 Tablet by mouth every six (6) hours as needed for Pain. Qty: 30 Tablet, Refills: 1         CONTINUE these medications which have NOT CHANGED    Details   sertraline (Zoloft) 100 mg tablet Take 100 mg by mouth daily. Indications: anxiousness associated with depression      prenatal vit-calcium-iron-fa (Prenatal Plus, calcium carb,) 27 mg iron- 1 mg tab Take 1 Tablet by mouth daily. folic acid (FOLVITE) 1 mg tablet Take 0.4 mg by mouth daily. STOP taking these medications       valACYclovir (Valtrex) 500 mg tablet Comments:   Reason for Stopping:         aspirin 81 mg chewable tablet Comments:   Reason for Stopping:         Iron BisGl &PS [id number] mg-mg-mcg-mg cap Comments:   Reason for Stopping:               Disposition at Discharge: Home or self care    Condition at Discharge: Stable    Reference my discharge instructions.     Follow-up Appointments   Procedures    FOLLOW UP VISIT Appointment in: 6 Weeks     Standing Status:   Standing     Number of Occurrences:   1     Order Specific Question:   Appointment in     Answer:   6 Weeks        Signed By:  Meche Dyson MD     May 18, 2022

## 2022-05-18 NOTE — DISCHARGE INSTRUCTIONS
POSTPARTUM DISCHARGE INSTRUCTIONS       Name:  Rodney Cruz  YOB: 1990  Admission Diagnosis:  PROM (premature rupture of membranes) [O42.90]  Twin pregnancy, dichorionic/diamniotic, third trimester [O30.043]     Discharge Diagnosis:    Problem List as of 5/16/2022 Never Reviewed          Codes Class Noted - Resolved    PROM (premature rupture of membranes) ICD-10-CM: O42.90  ICD-9-CM: 658.10  5/15/2022 - Present        Twin pregnancy, dichorionic/diamniotic, third trimester ICD-10-CM: O30.043  ICD-9-CM: 651.03, V91.03  5/15/2022 - Present            Attending Physician:  Navjot Al MD    Delivery Type:  Vaginal Childbirth: What To Expect At Home    Your Recovery: Your body will slowly heal in the next few weeks. It is easy to get too tired and overwhelmed during the first weeks after your baby is born. Changes in your hormones can shift your mood without warning. You may find it hard to meet the extra demands on your energy and time. Take it easy on yourself. Follow-up care is a key part of your treatment and safety. Be sure to make and go to all appointments, and call your doctor if you are having problems. It's also a good idea to know your test results and keep a list of the medicines you take. How can you care for yourself at home? Vaginal bleeding and cramps  · After delivery, you will have a bloody discharge from the vagina. This will turn pink within a week and then white or yellow after about 10 days. It may last for 2 to 4 weeks or longer, until the uterus has healed. Use pads instead of tampons until you stop bleeding. · Do not worry if you pass some blood clots, as long as they are smaller than a golf ball. If you have a tear or stitches in your vaginal area, change the pad at least every 4 hours to prevent soreness and infection. · You may have cramps for the first few days after childbirth. These are normal and occur as the uterus shrinks to normal size.  Take an over-the-counter pain medicine, such as acetaminophen (Tylenol), ibuprofen (Advil, Motrin), or naproxen (Aleve), for cramps. Read and follow all instructions on the label. Do not take aspirin, because it can cause more bleeding. Do not take acetaminophen (Tylenol) and other acetaminophen containing medications (i.e. Percocet) at the same time. Breast fullness  · Your breasts may overfill (engorge) in the first few days after delivery. To help milk flow and to relieve pain, warm your breasts in the shower or by using warm, moist towels before nursing. · If you are not nursing, do not put warmth on your breasts or touch your breasts. Wear a tight bra or sports bra and use ice until the fullness goes away. This usually takes 2 to 3 days. · Put ice or a cold pack on your breast after nursing to reduce swelling and pain. Put a thin cloth between the ice and your skin. Activity  · Eat a balanced diet. Do not try to lose weight by cutting calories. Keep taking your prenatal vitamins, or take a multivitamin. · Get as much rest as you can. Try to take naps when your baby sleeps during the day. · Get some exercise every day. But do not do any heavy exercise until your doctor says it is okay. · Wait until you are healed (about 4 to 6 weeks) before you have sexual intercourse. Your doctor will tell you when it is okay to have sex. · Talk to your doctor about birth control. You can get pregnant even before your period returns. Also, you can get pregnant while you are breast-feeding. Mental Health  · Many women get the \"baby blues\" during the first few days after childbirth. You may lose sleep, feel irritable, and cry easily. You may feel happy one minute and sad the next. Hormone changes are one cause of these emotional changes. Also, the demands of a new baby, along with visits from relatives or other family needs, add to a mother's stress. The \"baby blues\" often peak around the fourth day.  Then they ease up in less than 2 weeks. · If your moodiness or anxiety lasts for more than 2 weeks, or if you feel like life is not worth living, you may have postpartum depression. This is different for each mother. Some mothers with serious depression may worry intensely about their infant's well-being. Others may feel distant from their child. Some mothers might even feel that they might harm their baby. A mother may have signs of paranoia, wondering if someone is watching her. · With all the changes in your life, you may not know if you are depressed. Pregnancy sometimes causes changes in how you feel that are similar to the symptoms of depression. · Symptoms of depression include:  · Feeling sad or hopeless and losing interest in daily activities. These are the most common symptoms of depression. · Sleeping too much or not enough. · Feeling tired. You may feel as if you have no energy. · Eating too much or too little. · [employer] ([employer]I) offers a Warm line; Chat with the Expert phone sessions; Information and Articles about Pregnancy and Postpartum Mood Disorders; Comprehensive List of Free Support Groups; Knowledgeable local coordinators who will offer support, information, and resources; Guide to Resources on [employer]; Calendar of events in the  mood disorders community; Latest News and Research; and [facility] [address] for [employer]. Remember - You are not alone; You are not to blame; With help, you will be well. 9-368-546-PPD(7005). WWW. POSTPARTUM. NET   · Writing or talking about death, such as writing suicide notes or talking about guns, knives, or pills. Keep the numbers for these national suicide hotlines: 2-385-941-TALK (9-764.255.6070) and 4-810-DNJWEKZ (0-590.695.1531). If you or someone you know talks about suicide or feeling hopeless, get help right away.     Constipation and Hemorrhoids  · Drink plenty of fluids, enough so that your urine is light yellow or clear like water. If you have kidney, heart, or liver disease and have to limit fluids, talk with your doctor before you increase the amount of fluids you drink. · Eat plenty of fiber each day. Have a bran muffin or bran cereal for breakfast, and try eating a piece of fruit for a mid-afternoon snack. · For painful, itchy hemorrhoids, put ice or a cold pack on the area several times a day for 10 minutes at a time. Follow this by putting a warm compress on the area for another 10 to 20 minutes or by sitting in a shallow, warm bath. When should you call for help? Call 911 anytime you think you may need emergency care. For example, call if:  · You are thinking of hurting yourself, your baby, or anyone else. · You passed out (lost consciousness). · You have symptoms of a blood clot in your lung (called a pulmonary embolism). These may include:    · Sudden chest pain. · Trouble breathing. · Coughing up blood. Call your doctor now or seek immediate medical care if:  · You have severe vaginal bleeding. · You are soaking through a pad each hour for 2 or more hours. · Your vaginal bleeding seems to be getting heavier or is still bright red 4 days after delivery. · You are dizzy or lightheaded, or you feel like you may faint. · You are vomiting or cannot keep fluids down. · You have a fever. · You have new or more belly pain. · You pass tissue (not just blood). · Your vaginal discharge smells bad. · Your belly feels tender or full and hard. · Your breasts are continuously painful or red. · You feel sad, anxious, or hopeless for more than a few days. · You have sudden, severe pain in your belly. · You have symptoms of a blood clot in your leg (called a deep vein thrombosis),          such as:  · Pain in your calf, back of the knee, thigh, or groin. · Redness and swelling in your leg or groin. · You have symptoms of preeclampsia, such as:  · Sudden swelling of your face, hands, or feet.   · New vision problems (such as dimness or blurring). · A severe headache. · Your blood pressure is higher than it should be or rises suddenly. · You have new nausea or vomiting. Watch closely for changes in your health, and be sure to contact your doctor if you have any problems. Additional Information:  Postpartum Support    PARENTS:  Are you feeling sad or depressed? Is it difficult for you to enjoy yourself? Do you feel more irritable or tense? Do you feel anxious or panicky? Are you having difficulty bonding with your baby? Do you feel as if you are \"out of control\" or \"going crazy\"? Are you worried that you might hurt your baby or yourself? FAMILIES: Do you worry that something is wrong but don't know how to help? Do you think that your partner or spouse is having problems coping? Are you worried that it may never get better? While many women experience some mild mood change or \"the blues\" during or after the birth of a child, 1 in 9 women experience more significant symptoms of depression or anxiety. 1 in 10 Dads become depressed during the first year. Things you can do  Being a good parent includes taking care of yourself. If you take care of yourself, you will be able to take better care of your baby and your family. · Talk to a counselor or healthcare provider who has training in  mood and anxiety problems. · Learn as much as you can about pregnancy and postpartum depression and anxiety. · Get support from family and friends. Ask for help when you need it. · Join a support group in your area or online. · Keep active by walking, stretching or whatever form of exercise helps you to feel better. · Get enough rest and time for yourself. · Eat a healthy diet. · Don't give up! It may take more than one try to get the right help you need.        These are general instructions for a healthy lifestyle:    No smoking/ No tobacco products/ Avoid exposure to second hand smoke    Surgeon Ehsan Santo Warning:  Quitting smoking now greatly reduces serious risk to your health. Obesity, smoking, and sedentary lifestyle greatly increases your risk for illness    A healthy diet, regular physical exercise & weight monitoring are important for maintaining a healthy lifestyle    Recognize signs and symptoms of STROKE:    F-face looks uneven    A-arms unable to move or move unevenly    S-speech slurred or non-existent    T-time-call 911 as soon as signs and symptoms begin - DO NOT go       back to bed or wait to see if you get better - TIME IS BRAIN. I have had the opportunity to make my options or choices for discharge. I have received and understand   these instructions. POST DELIVERY DISCHARGE INSTRUCTIONS    Name: Claudy Delacruz  YOB: 1990  Primary Diagnosis: Active Problems:    PROM (premature rupture of membranes) (5/15/2022)      Twin pregnancy, dichorionic/diamniotic, third trimester (5/15/2022)       delivery (2022)      Delivery of twins, both live (2022)        General:     Diet/Diet Restrictions:  · Eight 8-ounce glasses of fluid daily (water, juices); avoid excessive caffeine intake. · Meals/snacks as desired which are high in fiber and carbohydrates and low in fat and cholesterol. Physical Activity / Restrictions / Safety:     · Avoid heavy lifting, no more that 8 lbs. For 2-3 weeks;   · Limit use of stairs to 2 times daily for the first week home. · No driving for one week. · Avoid intercourse 4-6 weeks, no douching or tampon use. · Check with obstetrician before starting or resuming an exercise program.      Discharge Instructions/Special Treatment/Home Care Needs:     · Continue prenatal vitamins. · Continue to use squirt bottle with warm water on your episiotomy after each bathroom use until bleeding stops. · If steri-strips applied to your incision, remove in 7-10 days.     Call your doctor for the following:     · Fever over 101 degrees by mouth.  · Vaginal bleeding heavier than a normal menstrual period or clots larger than a golf ball. · Red streaks or increased swelling of legs, painful red streaks on your breast.  · Painful urination, constipation and increased pain or swelling or discharge with your incision. · If you feel extremely anxious or overwhelmed. · If you have thoughts of harming yourself and/or your baby. Pain Management:     · Take Acetaminophen (Tylenol) or Ibuprofen (Advil, Motrin), as directed for pain. · Use a warm Sitz bath 3 times daily to relieve episiotomy or hemorrhoidal discomfort. · For hemorrhoidal discomfort, use Tucks and Anusol cream as needed and directed. · Heating pad to  incision as needed.      Follow-Up Care:     Appointment with MD:   Follow-up Appointments   Procedures    FOLLOW UP VISIT Appointment in: 6 Weeks     Standing Status:   Standing     Number of Occurrences:   1     Order Specific Question:   Appointment in     Answer:   6 Weeks     Telephone number: [phone]    Signed By: Michael Garcia MD                                                                                                   Date: 2022 Time: 12:41 PM

## 2022-05-18 NOTE — PROGRESS NOTES
1420 - Patient went to NICU to visit    [phone] Patient returned to unit    I have reviewed discharge instructions with the patient and spouse. The patient and spouse verbalized understanding. Patient has own pump at home and verbalized understanding that she can use hospital pump upon visits to the NICU.

## 2024-05-10 ENCOUNTER — OFFICE VISIT (OUTPATIENT)
Facility: CLINIC | Age: 34
End: 2024-05-10
Payer: COMMERCIAL

## 2024-05-10 VITALS
SYSTOLIC BLOOD PRESSURE: 96 MMHG | BODY MASS INDEX: 18.31 KG/M2 | DIASTOLIC BLOOD PRESSURE: 69 MMHG | WEIGHT: 116.7 LBS | OXYGEN SATURATION: 97 % | HEART RATE: 79 BPM | HEIGHT: 67 IN | TEMPERATURE: 98.2 F | RESPIRATION RATE: 16 BRPM

## 2024-05-10 DIAGNOSIS — D70.2 OTHER DRUG-INDUCED NEUTROPENIA (HCC): ICD-10-CM

## 2024-05-10 DIAGNOSIS — Z00.00 ADULT GENERAL MEDICAL EXAM: Primary | ICD-10-CM

## 2024-05-10 DIAGNOSIS — H10.32 ACUTE BACTERIAL CONJUNCTIVITIS OF LEFT EYE: ICD-10-CM

## 2024-05-10 PROBLEM — G36.0 NEUROMYELITIS OPTICA (HCC): Status: ACTIVE | Noted: 2018-02-08

## 2024-05-10 PROBLEM — O42.90 PROM (PREMATURE RUPTURE OF MEMBRANES): Status: RESOLVED | Noted: 2022-05-15 | Resolved: 2024-05-10

## 2024-05-10 PROBLEM — F41.9 ANXIETY: Status: ACTIVE | Noted: 2018-02-08

## 2024-05-10 PROBLEM — O30.043 TWIN PREGNANCY, DICHORIONIC/DIAMNIOTIC, THIRD TRIMESTER: Status: RESOLVED | Noted: 2022-05-15 | Resolved: 2024-05-10

## 2024-05-10 PROBLEM — D70.9 NEUTROPENIA (HCC): Status: ACTIVE | Noted: 2023-11-29

## 2024-05-10 LAB
BASOPHILS # BLD: 0.1 K/UL (ref 0–0.1)
BASOPHILS NFR BLD: 1 % (ref 0–1)
CHOLEST SERPL-MCNC: 225 MG/DL
DIFFERENTIAL METHOD BLD: ABNORMAL
EOSINOPHIL # BLD: 0.1 K/UL (ref 0–0.4)
EOSINOPHIL NFR BLD: 2 % (ref 0–7)
ERYTHROCYTE [DISTWIDTH] IN BLOOD BY AUTOMATED COUNT: 14.6 % (ref 11.5–14.5)
HCT VFR BLD AUTO: 40 % (ref 35–47)
HDLC SERPL-MCNC: 63 MG/DL
HDLC SERPL: 3.6 (ref 0–5)
HGB BLD-MCNC: 13.4 G/DL (ref 11.5–16)
IMM GRANULOCYTES # BLD AUTO: 0 K/UL (ref 0–0.04)
IMM GRANULOCYTES NFR BLD AUTO: 0 % (ref 0–0.5)
LDLC SERPL CALC-MCNC: 125.8 MG/DL (ref 0–100)
LYMPHOCYTES # BLD: 1.6 K/UL (ref 0.8–3.5)
LYMPHOCYTES NFR BLD: 32 % (ref 12–49)
MCH RBC QN AUTO: 29.5 PG (ref 26–34)
MCHC RBC AUTO-ENTMCNC: 33.5 G/DL (ref 30–36.5)
MCV RBC AUTO: 87.9 FL (ref 80–99)
MONOCYTES # BLD: 0.5 K/UL (ref 0–1)
MONOCYTES NFR BLD: 9 % (ref 5–13)
NEUTS SEG # BLD: 2.7 K/UL (ref 1.8–8)
NEUTS SEG NFR BLD: 56 % (ref 32–75)
NRBC # BLD: 0 K/UL (ref 0–0.01)
NRBC BLD-RTO: 0 PER 100 WBC
PLATELET # BLD AUTO: 232 K/UL (ref 150–400)
PMV BLD AUTO: 10.2 FL (ref 8.9–12.9)
RBC # BLD AUTO: 4.55 M/UL (ref 3.8–5.2)
TRIGL SERPL-MCNC: 181 MG/DL
VLDLC SERPL CALC-MCNC: 36.2 MG/DL
WBC # BLD AUTO: 4.9 K/UL (ref 3.6–11)

## 2024-05-10 PROCEDURE — 99385 PREV VISIT NEW AGE 18-39: CPT | Performed by: FAMILY MEDICINE

## 2024-05-10 RX ORDER — TRAZODONE HYDROCHLORIDE 150 MG/1
150 TABLET ORAL NIGHTLY
COMMUNITY
Start: 2024-06-05 | End: 2024-10-03

## 2024-05-10 RX ORDER — ALBUTEROL SULFATE 90 UG/1
2 AEROSOL, METERED RESPIRATORY (INHALATION) EVERY 4 HOURS PRN
COMMUNITY
Start: 2020-03-02

## 2024-05-10 RX ORDER — POLYMYXIN B SULFATE AND TRIMETHOPRIM 1; 10000 MG/ML; [USP'U]/ML
1 SOLUTION OPHTHALMIC EVERY 4 HOURS
Qty: 3 ML | Refills: 0 | Status: SHIPPED | OUTPATIENT
Start: 2024-05-10 | End: 2024-05-17

## 2024-05-10 SDOH — ECONOMIC STABILITY: FOOD INSECURITY: WITHIN THE PAST 12 MONTHS, THE FOOD YOU BOUGHT JUST DIDN'T LAST AND YOU DIDN'T HAVE MONEY TO GET MORE.: NEVER TRUE

## 2024-05-10 SDOH — ECONOMIC STABILITY: INCOME INSECURITY: HOW HARD IS IT FOR YOU TO PAY FOR THE VERY BASICS LIKE FOOD, HOUSING, MEDICAL CARE, AND HEATING?: NOT HARD AT ALL

## 2024-05-10 SDOH — ECONOMIC STABILITY: HOUSING INSECURITY
IN THE LAST 12 MONTHS, WAS THERE A TIME WHEN YOU DID NOT HAVE A STEADY PLACE TO SLEEP OR SLEPT IN A SHELTER (INCLUDING NOW)?: NO

## 2024-05-10 SDOH — ECONOMIC STABILITY: FOOD INSECURITY: WITHIN THE PAST 12 MONTHS, YOU WORRIED THAT YOUR FOOD WOULD RUN OUT BEFORE YOU GOT MONEY TO BUY MORE.: NEVER TRUE

## 2024-05-10 ASSESSMENT — PATIENT HEALTH QUESTIONNAIRE - PHQ9
SUM OF ALL RESPONSES TO PHQ QUESTIONS 1-9: 2
SUM OF ALL RESPONSES TO PHQ9 QUESTIONS 1 & 2: 2
2. FEELING DOWN, DEPRESSED OR HOPELESS: SEVERAL DAYS
1. LITTLE INTEREST OR PLEASURE IN DOING THINGS: SEVERAL DAYS
SUM OF ALL RESPONSES TO PHQ QUESTIONS 1-9: 2

## 2024-05-10 ASSESSMENT — ANXIETY QUESTIONNAIRES
IF YOU CHECKED OFF ANY PROBLEMS ON THIS QUESTIONNAIRE, HOW DIFFICULT HAVE THESE PROBLEMS MADE IT FOR YOU TO DO YOUR WORK, TAKE CARE OF THINGS AT HOME, OR GET ALONG WITH OTHER PEOPLE: SOMEWHAT DIFFICULT
3. WORRYING TOO MUCH ABOUT DIFFERENT THINGS: NOT AT ALL
4. TROUBLE RELAXING: SEVERAL DAYS
2. NOT BEING ABLE TO STOP OR CONTROL WORRYING: SEVERAL DAYS
6. BECOMING EASILY ANNOYED OR IRRITABLE: MORE THAN HALF THE DAYS
GAD7 TOTAL SCORE: 8
1. FEELING NERVOUS, ANXIOUS, OR ON EDGE: SEVERAL DAYS
5. BEING SO RESTLESS THAT IT IS HARD TO SIT STILL: MORE THAN HALF THE DAYS
7. FEELING AFRAID AS IF SOMETHING AWFUL MIGHT HAPPEN: SEVERAL DAYS

## 2024-05-10 NOTE — PROGRESS NOTES
Chief Complaint   Patient presents with    Establish Care     \"Have you been to the ER, urgent care clinic since your last visit?  Hospitalized since your last visit?\"    NO    “Have you seen or consulted any other health care providers outside of [facility] since your last visit?”    NO     “Have you had a pap smear?”    YES - Where: [facility] Dr. Nafisa Infante Nurse/CMA to request most recent records if not in the chart    No cervical cancer screening on file             Click Here for Release of Records Request

## 2024-05-10 NOTE — PROGRESS NOTES
Chief Complaint   Patient presents with    Establish Care     she is a 33 y.o. year old female who presents for CPE  Complete Physical Exam Questions:    LMP -  24  Last Pap (q 3 years, or q5 with HPV) -   Last Mammogram (50-74 biennially)- n/a  Hx of abnl Pap - No    1.  Do you follow a low fat diet?  yes  2.  Are you up to date on your Tdap (<10 years)?  Yes  3.  Have you ever had a Pneumovax vaccine (>65)?  Not applicable   PCV13 Not applicable   PPSV23 Not applicable  4.  Have you had Zoster vaccine (>60)? Not applicable  5.  Have you had the HPV - Gardasil (13- 26)? Yes  6.  Do you follow an exercise program?  No  7.  Do you smoke?  No  8.  Do you consider yourself overweight?  No  9.  Is there a family history of CAD< age 50? No  10.  Is there a family history of Cancer?  No  11.  Do you know your Cancer risks?  No  12.  Have you had a colonoscopy?  Not applicable  13. Have you been tested for HIV or other STI's? Yes HIV testing today(18-66 y/o)?No  14.  Have had a bone density scan or DEXA done(>65)?Not applicable  15.  Have you had an EKG performed in the last five years (>50)?  Not applicable    Other complaints: was on Retuximab with Neuro, developed neutropenia and has stopped, switching treatment.  Following with Neuro and Hematology at [facility].      Reviewed and agree with Nurse Note and duplicated in this note.  Reviewed PmHx, RxHx, FmHx, SocHx, AllgHx and updated and dated in the chart.    Family History   Problem Relation Age of Onset    Lymphoma Father        Past Medical History:   Diagnosis Date    Asthma     Herpes simplex virus (HSV) infection     Infertility, female     Neuromyelitis optica (devic) (HCC)      delivery 2022    Psychiatric problem     Anxiety and depression    Twin pregnancy, dichorionic/diamniotic, third trimester 05/15/2022      Social History     Socioeconomic History    Marital status:      Spouse name: None    Number of children: None    Years of

## 2024-05-28 RX ORDER — PREDNISONE 20 MG/1
20 TABLET ORAL DAILY
Qty: 7 TABLET | Refills: 0 | Status: SHIPPED | OUTPATIENT
Start: 2024-05-28 | End: 2024-06-04

## 2024-05-31 RX ORDER — ALBUTEROL SULFATE 2.5 MG/3ML
2.5 SOLUTION RESPIRATORY (INHALATION) 4 TIMES DAILY PRN
Qty: 120 EACH | Refills: 3 | Status: SHIPPED | OUTPATIENT
Start: 2024-05-31

## 2024-05-31 RX ORDER — BENZONATATE 100 MG/1
100 CAPSULE ORAL 3 TIMES DAILY PRN
Qty: 30 CAPSULE | Refills: 1 | Status: SHIPPED | OUTPATIENT
Start: 2024-05-31

## 2024-11-04 RX ORDER — POLYMYXIN B SULFATE AND TRIMETHOPRIM 1; 10000 MG/ML; [USP'U]/ML
1 SOLUTION OPHTHALMIC EVERY 4 HOURS
Qty: 10 ML | Refills: 0 | Status: SHIPPED | OUTPATIENT
Start: 2024-11-04 | End: 2024-11-11

## 2025-06-18 ENCOUNTER — RESULTS FOLLOW-UP (OUTPATIENT)
Facility: CLINIC | Age: 35
End: 2025-06-18

## 2025-06-18 ENCOUNTER — OFFICE VISIT (OUTPATIENT)
Facility: CLINIC | Age: 35
End: 2025-06-18
Payer: COMMERCIAL

## 2025-06-18 VITALS
TEMPERATURE: 98.6 F | DIASTOLIC BLOOD PRESSURE: 73 MMHG | WEIGHT: 116 LBS | HEART RATE: 106 BPM | SYSTOLIC BLOOD PRESSURE: 109 MMHG | HEIGHT: 67 IN | OXYGEN SATURATION: 98 % | BODY MASS INDEX: 18.21 KG/M2 | RESPIRATION RATE: 16 BRPM

## 2025-06-18 DIAGNOSIS — Z86.2 HX OF NEUTROPENIA: Primary | ICD-10-CM

## 2025-06-18 DIAGNOSIS — J06.9 RECURRENT URI (UPPER RESPIRATORY INFECTION): ICD-10-CM

## 2025-06-18 DIAGNOSIS — J06.9 RECURRENT URI (UPPER RESPIRATORY INFECTION): Primary | ICD-10-CM

## 2025-06-18 DIAGNOSIS — J32.0 CHRONIC MAXILLARY SINUSITIS: ICD-10-CM

## 2025-06-18 DIAGNOSIS — Z86.2 HX OF NEUTROPENIA: ICD-10-CM

## 2025-06-18 PROCEDURE — G8427 DOCREV CUR MEDS BY ELIG CLIN: HCPCS | Performed by: FAMILY MEDICINE

## 2025-06-18 PROCEDURE — 1036F TOBACCO NON-USER: CPT | Performed by: FAMILY MEDICINE

## 2025-06-18 PROCEDURE — G8419 CALC BMI OUT NRM PARAM NOF/U: HCPCS | Performed by: FAMILY MEDICINE

## 2025-06-18 PROCEDURE — 99214 OFFICE O/P EST MOD 30 MIN: CPT | Performed by: FAMILY MEDICINE

## 2025-06-18 RX ORDER — LEVOFLOXACIN 500 MG/1
500 TABLET, FILM COATED ORAL DAILY
Qty: 10 TABLET | Refills: 0 | Status: SHIPPED | OUTPATIENT
Start: 2025-06-18 | End: 2025-06-28

## 2025-06-18 SDOH — ECONOMIC STABILITY: FOOD INSECURITY: WITHIN THE PAST 12 MONTHS, THE FOOD YOU BOUGHT JUST DIDN'T LAST AND YOU DIDN'T HAVE MONEY TO GET MORE.: NEVER TRUE

## 2025-06-18 SDOH — ECONOMIC STABILITY: FOOD INSECURITY: WITHIN THE PAST 12 MONTHS, YOU WORRIED THAT YOUR FOOD WOULD RUN OUT BEFORE YOU GOT MONEY TO BUY MORE.: NEVER TRUE

## 2025-06-18 ASSESSMENT — PATIENT HEALTH QUESTIONNAIRE - PHQ9
SUM OF ALL RESPONSES TO PHQ QUESTIONS 1-9: 0
2. FEELING DOWN, DEPRESSED OR HOPELESS: NOT AT ALL
1. LITTLE INTEREST OR PLEASURE IN DOING THINGS: NOT AT ALL
SUM OF ALL RESPONSES TO PHQ QUESTIONS 1-9: 0

## 2025-06-18 NOTE — PROGRESS NOTES
Loretta Cadet (:  1990) is a 34 y.o. female, Established patient, here for evaluation of the following chief complaint(s):  Annual Exam (Pt states that she is here for a physical ) and Other (Pt states that she has been sick for the past two months)      Subjective   History of Present Illness  The patient presents for evaluation of recurrent infections.    She reports a history of recurrent infections, over approximately 2 months. Following a strep infection in one of her children, she tested negative but was treated with amoxicillin. Her condition improved temporarily, but after 2 weeks, she experienced similar symptoms and tested negative again. A different medication, azithromycin, was prescribed, but her condition deteriorated. She has been experiencing persistent postnasal drip, cough, ear pain, and voice changes. Additionally, she reports a sensation of a foreign body in her throat, akin to a popcorn kernel. Her symptoms are more pronounced when she expectorates phlegm.    She does not have any history of prolonged recovery from colds or strep infections. This is the second year that her twins are attending , and she did not experience similar issues last year. She has been dealing with nasal congestion and frequent nose blowing. She initiated the use of Flonase about 3 weeks ago, along with an allergy pill, but these interventions have not provided relief.    She was previously on rituximab for a decade but discontinued it due to hospitalization for neutropenia. She is currently on mycophenolate. Her neutrophil count was zero during her hospitalization, but her lab results were normal at her last check.    Review of Systems  As per HPI       Objective   Blood pressure 109/73, pulse (!) 106, temperature 98.6 °F (37 °C), temperature source Oral, resp. rate 16, height 1.704 m (5' 7.1\"), weight 52.6 kg (116 lb), last menstrual period 2025, SpO2 98%.  Physical Exam  General Appearance:

## 2025-06-18 NOTE — PROGRESS NOTES
Chief Complaint   Patient presents with    Annual Exam     Pt states that she is here for a physical     Other     Pt states that she has been sick for the past two months     Have you been to the ER, urgent care clinic since your last visit?  Hospitalized since your last visit?   NO    Have you seen or consulted any other health care providers outside our system since your last visit?   NO     “Have you had a pap smear?”    YES - [facility]  Nurse/CMA to request most recent records if not in the chart    No cervical cancer screening on file

## 2025-06-19 DIAGNOSIS — Z86.2 HX OF NEUTROPENIA: Primary | ICD-10-CM

## 2025-06-19 LAB
ALBUMIN SERPL-MCNC: 3.9 G/DL (ref 3.5–5)
ALBUMIN/GLOB SERPL: 1.5 (ref 1.1–2.2)
ALP SERPL-CCNC: 77 U/L (ref 45–117)
ALT SERPL-CCNC: 21 U/L (ref 12–78)
ANION GAP SERPL CALC-SCNC: 6 MMOL/L (ref 2–12)
AST SERPL-CCNC: 25 U/L (ref 15–37)
BASOPHILS # BLD: 0.06 K/UL (ref 0–0.1)
BASOPHILS NFR BLD: 2 % (ref 0–1)
BILIRUB SERPL-MCNC: 0.4 MG/DL (ref 0.2–1)
BUN SERPL-MCNC: 12 MG/DL (ref 6–20)
BUN/CREAT SERPL: 16 (ref 12–20)
CALCIUM SERPL-MCNC: 9.6 MG/DL (ref 8.5–10.1)
CHLORIDE SERPL-SCNC: 105 MMOL/L (ref 97–108)
CO2 SERPL-SCNC: 28 MMOL/L (ref 21–32)
CREAT SERPL-MCNC: 0.76 MG/DL (ref 0.55–1.02)
DIFFERENTIAL METHOD BLD: ABNORMAL
EOSINOPHIL # BLD: 0.06 K/UL (ref 0–0.4)
EOSINOPHIL NFR BLD: 2 % (ref 0–7)
ERYTHROCYTE [DISTWIDTH] IN BLOOD BY AUTOMATED COUNT: 13 % (ref 11.5–14.5)
GLOBULIN SER CALC-MCNC: 2.6 G/DL (ref 2–4)
GLUCOSE SERPL-MCNC: 100 MG/DL (ref 65–100)
HCT VFR BLD AUTO: 39.4 % (ref 35–47)
HGB BLD-MCNC: 12.7 G/DL (ref 11.5–16)
IMM GRANULOCYTES # BLD AUTO: 0 K/UL
IMM GRANULOCYTES NFR BLD AUTO: 0 %
LYMPHOCYTES # BLD: 1.13 K/UL (ref 0.8–3.5)
LYMPHOCYTES NFR BLD: 39 % (ref 12–49)
MCH RBC QN AUTO: 28.6 PG (ref 26–34)
MCHC RBC AUTO-ENTMCNC: 32.2 G/DL (ref 30–36.5)
MCV RBC AUTO: 88.7 FL (ref 80–99)
MONOCYTES # BLD: 0.61 K/UL (ref 0–1)
MONOCYTES NFR BLD: 21 % (ref 5–13)
NEUTS SEG # BLD: 1.04 K/UL (ref 1.8–8)
NEUTS SEG NFR BLD: 36 % (ref 32–75)
NRBC # BLD: 0 K/UL (ref 0–0.01)
NRBC BLD-RTO: 0 PER 100 WBC
PLATELET # BLD AUTO: 175 K/UL (ref 150–400)
PMV BLD AUTO: 10.3 FL (ref 8.9–12.9)
POTASSIUM SERPL-SCNC: 3.9 MMOL/L (ref 3.5–5.1)
PROT SERPL-MCNC: 6.5 G/DL (ref 6.4–8.2)
RBC # BLD AUTO: 4.44 M/UL (ref 3.8–5.2)
RBC MORPH BLD: ABNORMAL
SODIUM SERPL-SCNC: 139 MMOL/L (ref 136–145)
WBC # BLD AUTO: 2.9 K/UL (ref 3.6–11)

## 2025-06-21 LAB
IGA SERPL-MCNC: 39 MG/DL (ref 87–352)
IGE SERPL-ACNC: 4 IU/ML (ref 6–495)
IGG SERPL-MCNC: 551 MG/DL (ref 586–1602)
IGM SERPL-MCNC: 39 MG/DL (ref 26–217)